# Patient Record
Sex: MALE | Race: WHITE | NOT HISPANIC OR LATINO | Employment: FULL TIME | URBAN - METROPOLITAN AREA
[De-identification: names, ages, dates, MRNs, and addresses within clinical notes are randomized per-mention and may not be internally consistent; named-entity substitution may affect disease eponyms.]

---

## 2023-06-26 PROBLEM — E66.9 OBESITY (BMI 30-39.9): Status: ACTIVE | Noted: 2023-06-26

## 2023-06-26 PROBLEM — R79.89 ELEVATED TROPONIN: Status: ACTIVE | Noted: 2023-06-26

## 2023-06-26 PROBLEM — E78.5 HLD (HYPERLIPIDEMIA): Status: ACTIVE | Noted: 2023-06-26

## 2023-06-26 PROBLEM — I48.91 ATRIAL FIBRILLATION WITH RVR: Status: ACTIVE | Noted: 2023-06-26

## 2023-06-26 PROBLEM — R77.8 ELEVATED TROPONIN: Status: ACTIVE | Noted: 2023-06-26

## 2023-06-26 PROBLEM — I10 ESSENTIAL HYPERTENSION: Status: ACTIVE | Noted: 2023-06-26

## 2023-07-25 ENCOUNTER — TELEPHONE (OUTPATIENT)
Dept: CARDIOLOGY | Facility: CLINIC | Age: 58
End: 2023-07-25
Payer: COMMERCIAL

## 2023-07-25 RX ORDER — ATORVASTATIN CALCIUM 40 MG/1
40 TABLET, FILM COATED ORAL NIGHTLY
Qty: 30 TABLET | Refills: 2 | Status: SHIPPED | OUTPATIENT
Start: 2023-07-25 | End: 2023-10-23

## 2023-07-25 NOTE — TELEPHONE ENCOUNTER
Rx Refill Note  Requested Prescriptions     Pending Prescriptions Disp Refills    atorvastatin (LIPITOR) 40 MG tablet 30 tablet 2     Sig: Take 1 tablet by mouth Every Night for 90 days.      Last office visit with prescribing clinician: 7/18/2023   Last telemedicine visit with prescribing clinician: Visit date not found   Next office visit with prescribing clinician: 8/29/2023                         Would you like a call back once the refill request has been completed: [] Yes [] No    If the office needs to give you a call back, can they leave a voicemail: [] Yes [] No    Farida Pappas MA  07/25/23, 10:00 EDT

## 2023-07-25 NOTE — TELEPHONE ENCOUNTER
Incoming Refill Request      Medication requested (name and dose): ATORVASTATIN 40 MG    Pharmacy where request should be sent: [facility]    Additional details provided by patient:     Best call back number: [phone]    Does the patient have less than a 3 day supply:  [] Yes  [x] No    Nohemi Hernandez Rep  07/25/23, 09:51 EDT

## 2023-07-28 ENCOUNTER — TELEPHONE (OUTPATIENT)
Dept: CARDIOLOGY | Facility: CLINIC | Age: 58
End: 2023-07-28

## 2023-07-28 NOTE — TELEPHONE ENCOUNTER
Patient having frequent episodes of afib per Dr. Jade. Needing verification patient is still taking his Eliquis 5 mg BID.   Called and spoke with patient - states he is taking the Eliquis and does not need refills at this time.   Pt stated that he did stop taking his ASA on his own.

## 2023-08-21 ENCOUNTER — TELEPHONE (OUTPATIENT)
Dept: CARDIOLOGY | Facility: CLINIC | Age: 58
End: 2023-08-21
Payer: COMMERCIAL

## 2023-08-21 RX ORDER — DILTIAZEM HYDROCHLORIDE 240 MG/1
240 CAPSULE, COATED, EXTENDED RELEASE ORAL DAILY
Qty: 90 CAPSULE | Refills: 3 | Status: SHIPPED | OUTPATIENT
Start: 2023-08-21

## 2023-08-21 NOTE — TELEPHONE ENCOUNTER
Spoke with patient - advised to start new medication Cardizem 240mg QD  Verified it is in addition to medications he is already taking.   Pt understood.     Rx Refill Note  Requested Prescriptions     Signed Prescriptions Disp Refills    dilTIAZem CD (CARDIZEM CD) 240 MG 24 hr capsule 90 capsule 3     Sig: Take 1 capsule by mouth Daily.     Authorizing Provider: KENIA BRUCE     Ordering User: KRISTIE MORENO      Last office visit with prescribing clinician: 7/18/2023   Last telemedicine visit with prescribing clinician: Visit date not found   Next office visit with prescribing clinician: 8/22/2023                         Would you like a call back once the refill request has been completed: [] Yes [] No    If the office needs to give you a call back, can they leave a voicemail: [] Yes [] No    Kristie Moreno MA  08/21/23, 13:39 EDT

## 2023-08-21 NOTE — TELEPHONE ENCOUNTER
Pt left a voicemail stating that he sees Dr Jade and is being treated for afib. Pt reports being in afib for 4 days as of 8/18/23. Pt states he does not have any shortness of breath, light headiness, or dizziness. His hear rate has been an average of 146. Pt said that the meds that Dr Jade started him on have not changed anything. Please call pt at [phone].

## 2023-08-21 NOTE — TELEPHONE ENCOUNTER
Spoke with patient - states he has taken extra metoprolol when he feels high heart rates and it does not help. Denies any dizziness but is having some mild soa.    Moved 8/29/23 apt up to tomorrow 8/22/23 @ 930a.   Advised would send note to you and would call back with any orders. If no new orders we will just see him in the morning.   Pt understood

## 2023-08-22 ENCOUNTER — OFFICE VISIT (OUTPATIENT)
Dept: CARDIOLOGY | Facility: CLINIC | Age: 58
End: 2023-08-22
Payer: COMMERCIAL

## 2023-08-22 VITALS
HEART RATE: 55 BPM | SYSTOLIC BLOOD PRESSURE: 159 MMHG | HEIGHT: 75 IN | DIASTOLIC BLOOD PRESSURE: 97 MMHG | OXYGEN SATURATION: 96 % | WEIGHT: 264 LBS | BODY MASS INDEX: 32.83 KG/M2

## 2023-08-22 DIAGNOSIS — I48.91 ATRIAL FIBRILLATION WITH RVR: Primary | ICD-10-CM

## 2023-08-22 DIAGNOSIS — I10 ESSENTIAL HYPERTENSION: ICD-10-CM

## 2023-08-22 DIAGNOSIS — E66.9 OBESITY (BMI 30-39.9): ICD-10-CM

## 2023-08-22 DIAGNOSIS — R77.8 ELEVATED TROPONIN: ICD-10-CM

## 2023-08-22 PROCEDURE — 93000 ELECTROCARDIOGRAM COMPLETE: CPT | Performed by: INTERNAL MEDICINE

## 2023-08-22 PROCEDURE — 99214 OFFICE O/P EST MOD 30 MIN: CPT | Performed by: INTERNAL MEDICINE

## 2023-08-22 NOTE — PROGRESS NOTES
Encounter Date:08/22/2023  Last seen 7/18/2023      Patient ID: Nav Choudhary is a 58 y.o. male.    Chief Complaint:    Atrial fibrillation  Shortness of breath  Hypertension  Dyslipidemia     History of Present Illness  Patient was having increased palpitations more persistent last week.    Patient is taking metoprolol 25 mg twice a day.  Patient is anticoagulated with Eliquis.  Patient was started on Cardizem  mg a day.  Patient has taken his first dose today.    Since I have last seen, the patient has been without any chest discomfort ,shortness of breath,, dizziness or syncope.  Denies having any headache ,abdominal pain ,nausea, vomiting , diarrhea constipation, loss of weight or loss of appetite.  Denies having any excessive bruising ,hematuria or blood in the stool.    Review of all systems negative except as indicated.    Reviewed ROS.    Assessment and Plan         ]]]]]]]]]]]]]]]]]]]]]  History  =============  -Atrial fibrillation with RVR.  Patient has increasing frequency of palpitations.  Patient had work-up in [place] approximately 2 years ago.  Patient was maintaining sinus rhythm.  Patient is now having paroxysmal atrial fibrillation    - Shortness of breath     - Mild elevation of troponin at 180-flat (high-sensitivity troponin)  EKG showed no acute changes     Echocardiogram-6/27/2023 showed proximal posterior and inferior hypokinesis.  Lexiscan Cardiolite test is abnormal with proximal posterior wall ischemia.  6/27/2023     Cardiac cath 6/28/2023 revealed normal left ventricle function and normal coronary arteries.     - Hypertension dyslipidemia     - Exogenous obesity     Status post bilateral knee replacements     - Non-smoker     - Occasional beer drinking     - Allergic to iodine  =============  Plan  ==============  Patient was having increased palpitations more persistent last week.    Patient is taking metoprolol 25 mg twice a day.  Patient is anticoagulated with  Eliquis.  Patient was started on Cardizem  mg a day.  Patient has taken his first dose today.    History of A-fib with RVR.  Patient has converted to sinus rhythm.  Patient is on metoprolol XL 50 mg twice daily (25 mg tablets.)  Patient to take extra 25 mg as needed.    Continue Cardizem  mg a day.  If patient has bradycardia consideration will be given for reducing metoprolol XL to 1 tablet of 25 mg.     History of shortness of breath-improved.  Cardiac cath 6/28/2023 revealed normal coronary arteries and normal left ventricular function.     Hypertension-159/97  Maintain blood pressure log.  Patient was started on Cardizem CD.     Dyslipidemia-continue atorvastatin    Follow-up in the office in 1 week with EKG.  Consideration for ablation if patient has continued problems with symptomatic atrial fibrillation.    Current medications include Eliquis 5 mg twice daily atorvastatin 40 mg p.o. nightly Cardizem  mg a day sublingual nitroglycerin as needed and metoprolol XL 50 mg (25 mg tablet)    Further plan will depend on patient's progress.    Reviewed and updated-8/22/2023.    @@@Patient returns after going to the parking lot with palpitations.  Repeat EKG was performed and patient is in sinus rhythm.  Patient was educated regarding possible paroxysmal atrial fibrillation.  Cardizem CD was started.  Hopefully this will reduce the frequency or duration.  Patient started taking his Cardizem first time yesterday.@@@  ]]]]]]]]]]]]]]]]]]]]]]         Diagnosis Plan   1. Atrial fibrillation with RVR  ECG 12 Lead      2. Elevated troponin  ECG 12 Lead      3. Obesity (BMI 30-39.9)  ECG 12 Lead      4. Essential hypertension  ECG 12 Lead      LAB RESULTS (LAST 7 DAYS)    CBC        BMP        CMP         BNP        TROPONIN        CoAg        Creatinine Clearance  CrCl cannot be calculated (Patient's most recent lab result is older than the maximum 30 days allowed.).    ABG        Radiology  No radiology  results for the last day                The following portions of the patient's history were reviewed and updated as appropriate: allergies, current medications, past family history, past medical history, past social history, past surgical history, and problem list.    Review of Systems   Constitutional: Negative for malaise/fatigue.   Cardiovascular:  Positive for palpitations. Negative for chest pain, dyspnea on exertion and leg swelling.   Respiratory:  Negative for cough and shortness of breath.    Gastrointestinal:  Negative for abdominal pain, nausea and vomiting.   Neurological:  Negative for dizziness, focal weakness, headaches, light-headedness and numbness.   All other systems reviewed and are negative.      Current Outpatient Medications:     acetaminophen (TYLENOL) 325 MG tablet, Take 2 tablets by mouth Every 4 (Four) Hours As Needed for Mild Pain (temperature greater than 101F) for up to 90 days., Disp: 90 tablet, Rfl: 2    apixaban (ELIQUIS) 5 MG tablet tablet, Take 1 tablet by mouth 2 (Two) Times a Day., Disp: 180 tablet, Rfl: 4    atorvastatin (LIPITOR) 40 MG tablet, Take 1 tablet by mouth Every Night for 90 days., Disp: 30 tablet, Rfl: 2    dilTIAZem CD (CARDIZEM CD) 240 MG 24 hr capsule, Take 1 capsule by mouth Daily., Disp: 90 capsule, Rfl: 3    metoprolol succinate XL (TOPROL-XL) 25 MG 24 hr tablet, Take 2 tablets by mouth Every 12 (Twelve) Hours for 90 days., Disp: 180 tablet, Rfl: 3    nitroglycerin (NITROSTAT) 0.4 MG SL tablet, Place 1 tablet under the tongue Every 5 (Five) Minutes As Needed for Chest Pain (Only if SBP Greater Than 100) for up to 90 days. Take no more than 3 doses in 15 minutes., Disp: 30 tablet, Rfl: 12    Allergies   Allergen Reactions    Iodine Irritability     Topical irritation       Family History   Problem Relation Age of Onset    Heart disease Mother     Heart disease Father     Heart attack Father        Past Surgical History:   Procedure Laterality Date     "CARDIAC CATHETERIZATION N/A 6/28/2023    Procedure: Left Heart Cath and coronary angiogram;  Surgeon: Greg Jade MD;  [facility];  Service: Cardiovascular;  Laterality: N/A;    KNEE ARTHROPLASTY         Past Medical History:   Diagnosis Date    Abnormal ECG 6/27/23    Arrhythmia 3/13/2021    Atrial fibrillation     HLD (hyperlipidemia)     Hypertension        Family History   Problem Relation Age of Onset    Heart disease Mother     Heart disease Father     Heart attack Father        Social History     Socioeconomic History    Marital status:    Tobacco Use    Smoking status: Never     Passive exposure: Past (AS A CHILD)    Smokeless tobacco: Never   Vaping Use    Vaping Use: Never used   Substance and Sexual Activity    Alcohol use: Yes     Alcohol/week: 12.0 standard drinks     Types: 12 Cans of beer per week     Comment: Beer on occasion    Drug use: Never    Sexual activity: Yes     Partners: Female     Birth control/protection: None           ECG 12 Lead    Date/Time: 8/22/2023 9:34 AM  Performed by: Greg Jade MD  Authorized by: Greg Jade MD   Comparison: compared with previous ECG   Similar to previous ECG  Comparison to previous ECG: Sinus bradycardia 54/min nonspecific ST-T wave changes normal axis normal intervals no ectopy no significant change from previous EKG.    Repeat EKG showed sinus bradycardia (9:57 AM)        Objective:       Physical Exam    /97 (BP Location: Left arm, Patient Position: Sitting, Cuff Size: Adult)   Pulse 55   Ht 190.5 cm (75\")   Wt 120 kg (264 lb)   SpO2 96%   BMI 33.00 kg/mý   The patient is alert, oriented and in no distress.    Vital signs as noted above.    Head and neck revealed no carotid bruits or jugular venous distension.  No thyromegaly or lymphadenopathy is present.    Lungs clear.  No wheezing.  Breath sounds are normal bilaterally.    Heart normal first and second heart sounds.  No " murmur..  No pericardial rub is present.  No gallop is present.    Abdomen soft and nontender.  No organomegaly is present.    Extremities revealed good peripheral pulses without any pedal edema.    Skin warm and dry.    Musculoskeletal system is grossly normal.    CNS grossly normal.    Reviewed and updated.

## 2023-08-23 NOTE — PROGRESS NOTES
Encounter Date:09/12/2023  Last seen 8/22/2023      Patient ID: Nav Choudhary is a 58 y.o. male.    Chief Complaint:    Atrial fibrillation  Shortness of breath  Hypertension  Dyslipidemia  Atrial flutter with RVR     History of Present Illness  Patient was last seen 3/22/2023.  Patient has been intermittent palpitations but persistent in the last 48 hours.   Patient is taking metoprolol 50 mg twice a day.  (25 mg tablet  Patient is on Eliquis.  Patient is on Cardizem  mg a day.       Since I have last seen, the patient has been without any chest discomfort ,shortness of breath,, dizziness or syncope.  Denies having any headache ,abdominal pain ,nausea, vomiting , diarrhea constipation, loss of weight or loss of appetite.  Denies having any excessive bruising ,hematuria or blood in the stool.     Review of all systems negative except as indicated.     Reviewed ROS.    Assessment and Plan         ]]]]]]]]]]]]]]]]]]]]]  History  =============  -Atrial fibrillation with RVR.  Patient has increasing frequency of palpitations.  Patient had work-up in [place] approximately 2 years ago.  Patient was maintaining sinus rhythm.  Patient is now having paroxysmal atrial fibrillation     - Shortness of breath     - Mild elevation of troponin at 180-flat (high-sensitivity troponin)  EKG showed no acute changes     Echocardiogram-6/27/2023 showed proximal posterior and inferior hypokinesis.  Lexiscan Cardiolite test is abnormal with proximal posterior wall ischemia.  6/27/2023     Cardiac cath 6/28/2023 revealed normal left ventricle function and normal coronary arteries.     - Hypertension dyslipidemia     - Exogenous obesity     Status post bilateral knee replacements     - Non-smoker     - Occasional beer drinking     - Allergic to iodine  =============  Plan  ==============  Patient was having increased palpitations more persistent last week.      History of A-fib with RVR.  Patient has converted to sinus  rhythm.    Patient has been intermittent palpitations but persistent in the last 48 hours.   Patient is taking metoprolol 50 mg twice a day.  (25 mg tablet  Patient is on Eliquis.  Patient is on Cardizem  mg a day.    EKG today showed atrial flutter with rapid ventricular response at a rate of 145/min.    Cardiac cath 6/28/2023 revealed normal coronary arteries and normal left ventricular function.     Hypertension-124/87     Dyslipidemia-continue atorvastatin     Options were discussed with patient and patient's wife.  Have discussed with Dr. Mcdowell regarding possible ablation for atrial flutter.  Patient was advised admission to the hospital to try to slow down and convert from a flutter with RVR to sinus rhythm.  Patient is reluctant to be admitted today.  Patient increase metoprolol XL to 75 mg twice daily (25 mg tablet).  Patient is willing to come to the hospital tomorrow and may need to be treated with intravenous medication to try to control and hopefully convert to sinus rhythm.  Hopefully ablation can be arranged for Thursday.    Further plan will depend on patient's progress.  ]]]]]]]]]]]]]]]]]]]]]]                 Diagnosis Plan   1. Essential hypertension        2. Obesity (BMI 30-39.9)        3. Atrial fibrillation with RVR        LAB RESULTS (LAST 7 DAYS)    CBC        BMP        CMP         BNP        TROPONIN        CoAg        Creatinine Clearance  CrCl cannot be calculated (Patient's most recent lab result is older than the maximum 30 days allowed.).    ABG        Radiology  No radiology results for the last day                The following portions of the patient's history were reviewed and updated as appropriate: allergies, current medications, past family history, past medical history, past social history, past surgical history, and problem list.    Review of Systems   Constitutional: Negative for malaise/fatigue.   Cardiovascular:  Positive for palpitations (RACING - HAS IMPROVED  SINCE START OF NEW MED). Negative for chest pain, leg swelling and syncope.   Respiratory:  Positive for shortness of breath.    Skin:  Negative for rash.   Gastrointestinal:  Negative for nausea and vomiting.   Neurological:  Negative for dizziness, light-headedness and numbness.   All other systems reviewed and are negative.      Current Outpatient Medications:     acetaminophen (TYLENOL) 325 MG tablet, Take 2 tablets by mouth Every 4 (Four) Hours As Needed for Mild Pain (temperature greater than 101F) for up to 90 days., Disp: 90 tablet, Rfl: 2    apixaban (ELIQUIS) 5 MG tablet tablet, Take 1 tablet by mouth 2 (Two) Times a Day., Disp: 180 tablet, Rfl: 4    atorvastatin (LIPITOR) 40 MG tablet, TAKE 1 TABLET BY MOUTH EVERY NIGHT FOR 90 DAYS., Disp: 90 tablet, Rfl: 3    dilTIAZem CD (CARDIZEM CD) 240 MG 24 hr capsule, Take 1 capsule by mouth Daily., Disp: 90 capsule, Rfl: 3    metoprolol succinate XL (TOPROL-XL) 25 MG 24 hr tablet, Take 2 tablets by mouth Every 12 (Twelve) Hours for 90 days., Disp: 180 tablet, Rfl: 3    nitroglycerin (NITROSTAT) 0.4 MG SL tablet, Place 1 tablet under the tongue Every 5 (Five) Minutes As Needed for Chest Pain (Only if SBP Greater Than 100) for up to 90 days. Take no more than 3 doses in 15 minutes., Disp: 30 tablet, Rfl: 12    Allergies   Allergen Reactions    Iodine Irritability     Topical irritation       Family History   Problem Relation Age of Onset    Heart disease Mother     Heart disease Father     Heart attack Father        Past Surgical History:   Procedure Laterality Date    CARDIAC CATHETERIZATION N/A 6/28/2023    Procedure: Left Heart Cath and coronary angiogram;  Surgeon: Greg Jade MD;  Location: [facility] CATH [facility]AS[facility];  Service: Cardiovascular;  Laterality: N/A;    KNEE ARTHROPLASTY         Past Medical History:   Diagnosis Date    Abnormal ECG 6/27/23    Arrhythmia 3/13/2021    Atrial fibrillation     HLD (hyperlipidemia)     Hypertension        Family  "History   Problem Relation Age of Onset    Heart disease Mother     Heart disease Father     Heart attack Father        Social History     Socioeconomic History    Marital status:    Tobacco Use    Smoking status: Never     Passive exposure: Past (AS A CHILD)    Smokeless tobacco: Never   Vaping Use    Vaping Use: Never used   Substance and Sexual Activity    Alcohol use: Yes     Alcohol/week: 12.0 standard drinks     Types: 12 Cans of beer per week     Comment: Beer on occasion    Drug use: Never    Sexual activity: Yes     Partners: Female     Birth control/protection: None           ECG 12 Lead    Date/Time: 9/12/2023 2:06 PM  Performed by: Greg Jade MD  Authorized by: Greg Jade MD   Comparison: compared with previous ECG   Comparison to previous ECG: Atrial flutter with rapid ventricular response 145/min nonspecific ST-T wave changes indeterminate axis no ectopy compared to 8/22/2023 atrial flutter with RVR is new.        Objective:       Physical Exam    /87 (BP Location: Left arm, Patient Position: Sitting, Cuff Size: Large Adult)   Pulse (!) 145 Comment: EKG READING  Ht 190.5 cm (75\")   Wt 122 kg (268 lb)   SpO2 98% Comment: RA  BMI 33.50 kg/m²   The patient is alert, oriented and in no distress.    Vital signs as noted above.    Head and neck revealed no carotid bruits or jugular venous distension.  No thyromegaly or lymphadenopathy is present.    Lungs clear.  No wheezing.  Breath sounds are normal bilaterally.    Heart normal first and second heart sounds.  No murmur..  No pericardial rub is present.  No gallop is present.    Abdomen soft and nontender.  No organomegaly is present.    Extremities revealed good peripheral pulses without any pedal edema.    Skin warm and dry.    Musculoskeletal system is grossly normal.    CNS grossly normal.    Reviewed and updated.        "

## 2023-08-28 RX ORDER — ATORVASTATIN CALCIUM 40 MG/1
40 TABLET, FILM COATED ORAL NIGHTLY
Qty: 90 TABLET | Refills: 3 | Status: SHIPPED | OUTPATIENT
Start: 2023-08-28 | End: 2024-08-22

## 2023-08-28 NOTE — TELEPHONE ENCOUNTER
[facility]-CoA Reductase Inhibitors (Statins) Protocol [id number]/26/2023 01:46 PM   Protocol Details ALK Phos in past 12 months    ALT in past 12 months    AST in past 12 months       Rx Refill Note  Requested Prescriptions     Pending Prescriptions Disp Refills    atorvastatin (LIPITOR) 40 MG tablet [Pharmacy Med Name: ATORVASTATIN 40 MG TABLET] 90 tablet      Sig: TAKE 1 TABLET BY MOUTH EVERY NIGHT FOR 90 DAYS.      Last office visit with prescribing clinician: 8/22/2023   Last telemedicine visit with prescribing clinician: Visit date not found   Next office visit with prescribing clinician: 9/12/2023                         Would you like a call back once the refill request has been completed: [] Yes [] No    If the office needs to give you a call back, can they leave a voicemail: [] Yes [] No    Kristie Moreno MA  08/28/23, 08:33 EDT

## 2023-09-12 ENCOUNTER — OFFICE VISIT (OUTPATIENT)
Dept: CARDIOLOGY | Facility: CLINIC | Age: 58
End: 2023-09-12
Payer: COMMERCIAL

## 2023-09-12 VITALS
HEART RATE: 145 BPM | HEIGHT: 75 IN | DIASTOLIC BLOOD PRESSURE: 87 MMHG | SYSTOLIC BLOOD PRESSURE: 124 MMHG | BODY MASS INDEX: 33.32 KG/M2 | WEIGHT: 268 LBS | OXYGEN SATURATION: 98 %

## 2023-09-12 DIAGNOSIS — Z79.01 CHRONIC ANTICOAGULATION: ICD-10-CM

## 2023-09-12 DIAGNOSIS — I10 ESSENTIAL HYPERTENSION: Primary | ICD-10-CM

## 2023-09-12 DIAGNOSIS — I48.91 ATRIAL FIBRILLATION WITH RVR: ICD-10-CM

## 2023-09-12 DIAGNOSIS — I48.92 ATRIAL FLUTTER WITH RAPID VENTRICULAR RESPONSE: ICD-10-CM

## 2023-09-12 DIAGNOSIS — E66.9 OBESITY (BMI 30-39.9): ICD-10-CM

## 2023-09-13 ENCOUNTER — APPOINTMENT (OUTPATIENT)
Dept: GENERAL RADIOLOGY | Facility: HOSPITAL | Age: 58
DRG: 274 | End: 2023-09-13
Payer: COMMERCIAL

## 2023-09-13 ENCOUNTER — HOSPITAL ENCOUNTER (INPATIENT)
Facility: HOSPITAL | Age: 58
LOS: 1 days | Discharge: HOME | DRG: 274 | End: 2023-09-15
Attending: EMERGENCY MEDICINE
Payer: COMMERCIAL

## 2023-09-13 ENCOUNTER — TELEPHONE (OUTPATIENT)
Dept: CARDIOLOGY | Facility: CLINIC | Age: 58
End: 2023-09-13
Payer: COMMERCIAL

## 2023-09-13 DIAGNOSIS — I48.91 ATRIAL FIBRILLATION WITH RVR: ICD-10-CM

## 2023-09-13 DIAGNOSIS — R00.2 PALPITATIONS: Primary | ICD-10-CM

## 2023-09-13 DIAGNOSIS — I48.3 TYPICAL ATRIAL FLUTTER: ICD-10-CM

## 2023-09-13 PROBLEM — I48.92 ATRIAL FLUTTER: Status: ACTIVE | Noted: 2023-09-13

## 2023-09-13 LAB
ALBUMIN SERPL-MCNC: 4 G/DL (ref 3.5–5.2)
ALBUMIN/GLOB SERPL: 1.9 G/DL
ALP SERPL-CCNC: 81 U/L (ref 39–117)
ALT SERPL W P-5'-P-CCNC: 22 U/L (ref 1–41)
ANION GAP SERPL CALCULATED.3IONS-SCNC: 11 MMOL/L (ref 5–15)
AST SERPL-CCNC: 17 U/L (ref 1–40)
BASOPHILS # BLD AUTO: 0.1 10*3/MM3 (ref 0–0.2)
BASOPHILS NFR BLD AUTO: 1 % (ref 0–1.5)
BILIRUB SERPL-MCNC: 0.4 MG/DL (ref 0–1.2)
BUN SERPL-MCNC: 17 MG/DL (ref 6–20)
BUN/CREAT SERPL: 15.6 (ref 7–25)
CALCIUM SPEC-SCNC: 8.9 MG/DL (ref 8.6–10.5)
CHLORIDE SERPL-SCNC: 107 MMOL/L (ref 98–107)
CO2 SERPL-SCNC: 22 MMOL/L (ref 22–29)
CREAT SERPL-MCNC: 1.09 MG/DL (ref 0.76–1.27)
DEPRECATED RDW RBC AUTO: 45.9 FL (ref 37–54)
EGFRCR SERPLBLD CKD-EPI 2021: 79.7 ML/MIN/1.73
EOSINOPHIL # BLD AUTO: 0.1 10*3/MM3 (ref 0–0.4)
EOSINOPHIL NFR BLD AUTO: 2 % (ref 0.3–6.2)
ERYTHROCYTE [DISTWIDTH] IN BLOOD BY AUTOMATED COUNT: 14.8 % (ref 12.3–15.4)
GLOBULIN UR ELPH-MCNC: 2.1 GM/DL
GLUCOSE SERPL-MCNC: 101 MG/DL (ref 65–99)
HCT VFR BLD AUTO: 43.9 % (ref 37.5–51)
HGB BLD-MCNC: 14.7 G/DL (ref 13–17.7)
HOLD SPECIMEN: NORMAL
LYMPHOCYTES # BLD AUTO: 1.4 10*3/MM3 (ref 0.7–3.1)
LYMPHOCYTES NFR BLD AUTO: 18.9 % (ref 19.6–45.3)
MCH RBC QN AUTO: 30.2 PG (ref 26.6–33)
MCHC RBC AUTO-ENTMCNC: 33.6 G/DL (ref 31.5–35.7)
MCV RBC AUTO: 89.9 FL (ref 79–97)
MONOCYTES # BLD AUTO: 0.7 10*3/MM3 (ref 0.1–0.9)
MONOCYTES NFR BLD AUTO: 9.8 % (ref 5–12)
NEUTROPHILS NFR BLD AUTO: 5.1 10*3/MM3 (ref 1.7–7)
NEUTROPHILS NFR BLD AUTO: 68.3 % (ref 42.7–76)
NRBC BLD AUTO-RTO: 0.1 /100 WBC (ref 0–0.2)
NT-PROBNP SERPL-MCNC: 1230 PG/ML (ref 0–900)
PLATELET # BLD AUTO: 219 10*3/MM3 (ref 140–450)
PMV BLD AUTO: 8.2 FL (ref 6–12)
POTASSIUM SERPL-SCNC: 4 MMOL/L (ref 3.5–5.2)
PROT SERPL-MCNC: 6.1 G/DL (ref 6–8.5)
RBC # BLD AUTO: 4.88 10*6/MM3 (ref 4.14–5.8)
SODIUM SERPL-SCNC: 140 MMOL/L (ref 136–145)
TROPONIN T SERPL HS-MCNC: 8 NG/L
WBC NRBC COR # BLD: 7.5 10*3/MM3 (ref 3.4–10.8)
WHOLE BLOOD HOLD COAG: NORMAL

## 2023-09-13 PROCEDURE — 99285 EMERGENCY DEPT VISIT HI MDM: CPT

## 2023-09-13 PROCEDURE — 84484 ASSAY OF TROPONIN QUANT: CPT | Performed by: NURSE PRACTITIONER

## 2023-09-13 PROCEDURE — 71045 X-RAY EXAM CHEST 1 VIEW: CPT

## 2023-09-13 PROCEDURE — G0378 HOSPITAL OBSERVATION PER HR: HCPCS

## 2023-09-13 PROCEDURE — 80053 COMPREHEN METABOLIC PANEL: CPT | Performed by: NURSE PRACTITIONER

## 2023-09-13 PROCEDURE — 83880 ASSAY OF NATRIURETIC PEPTIDE: CPT | Performed by: NURSE PRACTITIONER

## 2023-09-13 PROCEDURE — 25010000002 AMIODARONE IN DEXTROSE 5% 150-4.21 MG/100ML-% SOLUTION: Performed by: NURSE PRACTITIONER

## 2023-09-13 PROCEDURE — 85025 COMPLETE CBC W/AUTO DIFF WBC: CPT | Performed by: NURSE PRACTITIONER

## 2023-09-13 PROCEDURE — 93005 ELECTROCARDIOGRAM TRACING: CPT | Performed by: EMERGENCY MEDICINE

## 2023-09-13 PROCEDURE — 25010000002 AMIODARONE IN DEXTROSE 5% 360-4.14 MG/200ML-% SOLUTION: Performed by: NURSE PRACTITIONER

## 2023-09-13 RX ORDER — SODIUM CHLORIDE 0.9 % (FLUSH) 0.9 %
10 SYRINGE (ML) INJECTION AS NEEDED
Status: DISCONTINUED | OUTPATIENT
Start: 2023-09-13 | End: 2023-09-15 | Stop reason: HOSPADM

## 2023-09-13 RX ORDER — POLYETHYLENE GLYCOL 3350 17 G/17G
17 POWDER, FOR SOLUTION ORAL DAILY PRN
Status: DISCONTINUED | OUTPATIENT
Start: 2023-09-13 | End: 2023-09-15 | Stop reason: HOSPADM

## 2023-09-13 RX ORDER — METOPROLOL TARTRATE 5 MG/5ML
5 INJECTION INTRAVENOUS ONCE
Status: COMPLETED | OUTPATIENT
Start: 2023-09-13 | End: 2023-09-13

## 2023-09-13 RX ORDER — DILTIAZEM HYDROCHLORIDE 240 MG/1
240 CAPSULE, COATED, EXTENDED RELEASE ORAL DAILY
Status: DISCONTINUED | OUTPATIENT
Start: 2023-09-13 | End: 2023-09-14

## 2023-09-13 RX ORDER — SODIUM CHLORIDE 0.9 % (FLUSH) 0.9 %
10 SYRINGE (ML) INJECTION EVERY 12 HOURS SCHEDULED
Status: DISCONTINUED | OUTPATIENT
Start: 2023-09-13 | End: 2023-09-15 | Stop reason: HOSPADM

## 2023-09-13 RX ORDER — SODIUM CHLORIDE 9 MG/ML
40 INJECTION, SOLUTION INTRAVENOUS AS NEEDED
Status: DISCONTINUED | OUTPATIENT
Start: 2023-09-13 | End: 2023-09-15 | Stop reason: HOSPADM

## 2023-09-13 RX ORDER — NITROGLYCERIN 0.4 MG/1
0.4 TABLET SUBLINGUAL
Status: DISCONTINUED | OUTPATIENT
Start: 2023-09-13 | End: 2023-09-15 | Stop reason: HOSPADM

## 2023-09-13 RX ORDER — ATORVASTATIN CALCIUM 40 MG/1
40 TABLET, FILM COATED ORAL DAILY
COMMUNITY

## 2023-09-13 RX ORDER — DILTIAZEM HCL/D5W 125 MG/125
5-15 PLASTIC BAG, INJECTION (ML) INTRAVENOUS
Status: DISCONTINUED | OUTPATIENT
Start: 2023-09-13 | End: 2023-09-13

## 2023-09-13 RX ORDER — AMOXICILLIN 250 MG
2 CAPSULE ORAL 2 TIMES DAILY
Status: DISCONTINUED | OUTPATIENT
Start: 2023-09-13 | End: 2023-09-15 | Stop reason: HOSPADM

## 2023-09-13 RX ORDER — ATORVASTATIN CALCIUM 40 MG/1
40 TABLET, FILM COATED ORAL DAILY
Status: DISCONTINUED | OUTPATIENT
Start: 2023-09-14 | End: 2023-09-15 | Stop reason: HOSPADM

## 2023-09-13 RX ORDER — BISACODYL 5 MG/1
5 TABLET, DELAYED RELEASE ORAL DAILY PRN
Status: DISCONTINUED | OUTPATIENT
Start: 2023-09-13 | End: 2023-09-15 | Stop reason: HOSPADM

## 2023-09-13 RX ORDER — ACETAMINOPHEN 325 MG/1
650 TABLET ORAL EVERY 4 HOURS PRN
Status: DISCONTINUED | OUTPATIENT
Start: 2023-09-13 | End: 2023-09-15 | Stop reason: HOSPADM

## 2023-09-13 RX ORDER — BISACODYL 10 MG
10 SUPPOSITORY, RECTAL RECTAL DAILY PRN
Status: DISCONTINUED | OUTPATIENT
Start: 2023-09-13 | End: 2023-09-15 | Stop reason: HOSPADM

## 2023-09-13 RX ORDER — ONDANSETRON 2 MG/ML
4 INJECTION INTRAMUSCULAR; INTRAVENOUS EVERY 6 HOURS PRN
Status: DISCONTINUED | OUTPATIENT
Start: 2023-09-13 | End: 2023-09-15 | Stop reason: HOSPADM

## 2023-09-13 RX ORDER — DILTIAZEM HYDROCHLORIDE 5 MG/ML
20 INJECTION INTRAVENOUS ONCE
Status: COMPLETED | OUTPATIENT
Start: 2023-09-13 | End: 2023-09-13

## 2023-09-13 RX ORDER — METOPROLOL SUCCINATE 50 MG/1
50 TABLET, EXTENDED RELEASE ORAL EVERY 12 HOURS SCHEDULED
Status: DISCONTINUED | OUTPATIENT
Start: 2023-09-13 | End: 2023-09-14

## 2023-09-13 RX ADMIN — DILTIAZEM HYDROCHLORIDE 20 MG: 5 INJECTION INTRAVENOUS at 15:51

## 2023-09-13 RX ADMIN — AMIODARONE HYDROCHLORIDE 150 MG: 1.5 INJECTION, SOLUTION INTRAVENOUS at 17:41

## 2023-09-13 RX ADMIN — METOPROLOL SUCCINATE 50 MG: 50 TABLET, EXTENDED RELEASE ORAL at 20:44

## 2023-09-13 RX ADMIN — Medication 5 MG/HR: at 16:23

## 2023-09-13 RX ADMIN — AMIODARONE HYDROCHLORIDE 1 MG/MIN: 1.8 INJECTION, SOLUTION INTRAVENOUS at 23:29

## 2023-09-13 RX ADMIN — METOPROLOL TARTRATE 5 MG: 1 INJECTION, SOLUTION INTRAVENOUS at 18:11

## 2023-09-13 RX ADMIN — APIXABAN 5 MG: 5 TABLET, FILM COATED ORAL at 20:44

## 2023-09-13 RX ADMIN — AMIODARONE HYDROCHLORIDE 1 MG/MIN: 1.8 INJECTION, SOLUTION INTRAVENOUS at 18:04

## 2023-09-13 RX ADMIN — Medication 10 ML: at 20:44

## 2023-09-13 RX ADMIN — DILTIAZEM HYDROCHLORIDE 240 MG: 240 CAPSULE, EXTENDED RELEASE ORAL at 19:55

## 2023-09-13 NOTE — TELEPHONE ENCOUNTER
Pt is still in afib, asking should he go to hospital?  Make ov? Change medication?  States he did not go to hospital yesterday

## 2023-09-13 NOTE — CONSULTS
Referring Provider: Daniele Tamayo DO  Reason for Consultation:  Persistent atrial flutter    Patient Care Team:  Provider, No Known as PCP - General    Chief complaint  Abnormal heart rhythm    Subjective .     History of present illness:  Nav Choudhary is a 58 y.o. male who presents with history of abnormal heart rhythm.  Patient has history of atrial fibrillation.  Patient had ablation in the past.  Patient recently has been having recurrent and persistent atrial flutter.  Patient has been on Cardizem and metoprolol as well as anticoagulation with Eliquis.  Patient was seen in the office and patient was advised admission to the hospital since patient had atrial flutter with rate of 145/min.  Patient wanted to wait and return to the ER today.  Patient initially was started on intravenous Cardizem and patient did not respond to decreased rate.  Patient was started on intravenous amiodarone.    ROS      The patient has been without any chest discomfort, shortness of breath, palpitations, dizziness or syncope.  Denies having any headache, abdominal pain, nausea, vomiting, diarrhea, constipation, loss of weight or loss of appetite.  Denies having any excessive bruising, hematuria or blood in the stool.    Review of all systems negative except as indicated      History  Past Medical History:   Diagnosis Date    Abnormal ECG 6/27/23    Arrhythmia 3/13/2021    Atrial fibrillation     HLD (hyperlipidemia)     Hypertension        Past Surgical History:   Procedure Laterality Date    CARDIAC CATHETERIZATION N/A 6/28/2023    Procedure: Left Heart Cath and coronary angiogram;  Surgeon: Greg Jade MD;  Location: [facility] CATH INVASIVE LOCATION;  Service: Cardiovascular;  Laterality: N/A;    KNEE ARTHROPLASTY         Family History   Problem Relation Age of Onset    Heart disease Mother     Heart disease Father     Heart attack Father        Social History     Tobacco Use    Smoking status: Never     Passive  "exposure: Past (AS A CHILD)    Smokeless tobacco: Never   Vaping Use    Vaping Use: Never used   Substance Use Topics    Alcohol use: Yes     Alcohol/week: 12.0 standard drinks     Types: 12 Cans of beer per week     Comment: Beer on occasion    Drug use: Never        (Not in a hospital admission)        Iodine    Scheduled Meds:   Continuous Infusions:amiodarone, 1 mg/min, Last Rate: 1 mg/min (09/13/23 1804)   Followed by  [START ON 9/14/2023] amiodarone, 0.5 mg/min      PRN Meds:.  sodium chloride    Objective     VITAL SIGNS  Vitals:    09/13/23 1438 09/13/23 1629 09/13/23 1747 09/13/23 1811   BP:  126/93 128/92 129/93   Pulse:  (!) 146 (!) 144 (!) 148   Resp: 16 18     Temp: 98.3 °F (36.8 °C)      TempSrc: Oral      SpO2:  98%     Weight: 122 kg (268 lb 15.4 oz)      Height: 190.5 cm (75\")          Flowsheet Rows      Flowsheet Row First Filed Value   Admission Height 190.5 cm (75\") Documented at 09/13/2023 1438   Admission Weight 122 kg (268 lb 15.4 oz) Documented at 09/13/2023 1438            No intake or output data in the 24 hours ending 09/13/23 1825     TELEMETRY: Atrial flutter with rapid ventricular response.    Physical Exam:  The patient is alert, oriented and in no distress.  Vital signs as noted above.  Head and neck revealed no carotid bruits or jugular venous distention.  No thyromegaly or lymphadenopathy is present  Lungs clear.  No wheezing.  Breath sounds are normal bilaterally.  Heart normal first and second heart sounds. No murmur.  No precordial rub is present.  No gallop is present.  Abdomen soft and nontender.  No organomegaly is present.  Extremities with good peripheral pulses without any pedal edema.  Skin warm and dry.  Musculoskeletal system is grossly normal  CNS grossly normal    Reviewed and updated.    Results Review:   I reviewed the patient's new clinical results.  Lab Results (last 24 hours)       Procedure Component Value Units Date/Time    Extra Tubes [[id number]] Collected: " 09/13/23 1510    Specimen: Blood, Venous Line Updated: 09/13/23 1615    Narrative:      The following orders were created for panel order Extra Tubes.  Procedure                               Abnormality         Status                     ---------                               -----------         ------                     Gold Top - SST[[id number]]                                   Final result               Light Blue Top[[id number]]                                   Final result                 Please view results for these tests on the individual orders.    Gold Top - SST [[id number]] Collected: 09/13/23 1510    Specimen: Blood Updated: 09/13/23 1615     Extra Tube Hold for add-ons.     Comment: Auto resulted.       Light Blue Top [[id number]] Collected: 09/13/23 1510    Specimen: Blood Updated: 09/13/23 1615     Extra Tube Hold for add-ons.     Comment: Auto resulted       Comprehensive Metabolic Panel [[id number]]  (Abnormal) Collected: 09/13/23 1510    Specimen: Blood Updated: 09/13/23 1543     Glucose 101 mg/dL      BUN 17 mg/dL      Creatinine 1.09 mg/dL      Sodium 140 mmol/L      Potassium 4.0 mmol/L      Chloride 107 mmol/L      CO2 22.0 mmol/L      Calcium 8.9 mg/dL      Total Protein 6.1 g/dL      Albumin 4.0 g/dL      ALT (SGPT) 22 U/L      AST (SGOT) 17 U/L      Alkaline Phosphatase 81 U/L      Total Bilirubin 0.4 mg/dL      Globulin 2.1 gm/dL      A/G Ratio 1.9 g/dL      BUN/Creatinine Ratio 15.6     Anion Gap 11.0 mmol/L      eGFR 79.7 mL/min/1.73     Narrative:      GFR Normal >60  Chronic Kidney Disease <60  Kidney Failure <15      Single High Sensitivity Troponin T [[id number]]  (Normal) Collected: 09/13/23 1510    Specimen: Blood Updated: 09/13/23 1543     HS Troponin T 8 ng/L     Narrative:      High Sensitive Troponin T Reference Range:  <10.0 ng/L- Negative Female for AMI  <15.0 ng/L- Negative Male for AMI  >=10 - Abnormal Female indicating possible myocardial injury.  >=15 - Abnormal Male  indicating possible myocardial injury.   Clinicians would have to utilize clinical acumen, EKG, Troponin, and serial changes to determine if it is an Acute Myocardial Infarction or myocardial injury due to an underlying chronic condition.         BNP [[id number]]  (Abnormal) Collected: 09/13/23 1510    Specimen: Blood Updated: 09/13/23 1543     proBNP 1,230.0 pg/mL     Narrative:      Among patients with dyspnea, NT-proBNP is highly sensitive for the detection of acute congestive heart failure. In addition NT-proBNP of <300 pg/ml effectively rules out acute congestive heart failure with 99% negative predictive value.      CBC & Differential [[id number]]  (Abnormal) Collected: 09/13/23 1510    Specimen: Blood Updated: 09/13/23 1521    Narrative:      The following orders were created for panel order CBC & Differential.  Procedure                               Abnormality         Status                     ---------                               -----------         ------                     CBC Auto Differential[[id number]]        Abnormal            Final result                 Please view results for these tests on the individual orders.    CBC Auto Differential [[id number]]  (Abnormal) Collected: 09/13/23 1510    Specimen: Blood Updated: 09/13/23 1521     WBC 7.50 10*3/mm3      RBC 4.88 10*6/mm3      Hemoglobin 14.7 g/dL      Hematocrit 43.9 %      MCV 89.9 fL      MCH 30.2 pg      MCHC 33.6 g/dL      RDW 14.8 %      RDW-SD 45.9 fl      MPV 8.2 fL      Platelets 219 10*3/mm3      Neutrophil % 68.3 %      Lymphocyte % 18.9 %      Monocyte % 9.8 %      Eosinophil % 2.0 %      Basophil % 1.0 %      Neutrophils, Absolute 5.10 10*3/mm3      Lymphocytes, Absolute 1.40 10*3/mm3      Monocytes, Absolute 0.70 10*3/mm3      Eosinophils, Absolute 0.10 10*3/mm3      Basophils, Absolute 0.10 10*3/mm3      nRBC 0.1 /100 WBC             Imaging Results (Last 24 Hours)       Procedure Component Value Units Date/Time    XR Chest 1  View [[id number]] Collected: 09/13/23 1537     Updated: 09/13/23 1540    Narrative:      XR CHEST 1 VW    Date of Exam: 9/13/2023 3:24 PM EDT    Indication: CHF/COPD Protocol CHF/COPD Protocol    Comparison: None available.    Findings:  Cardiomediastinal silhouette is within normal limits, considering portable technique. No focal consolidation or overt pulmonary edema. No pleural effusion or pneumothorax. Osseous structures are unremarkable.      Impression:      Impression:  No evidence of acute cardiopulmonary disease.    Electronically Signed: Flip Springer MD    9/13/2023 3:38 PM EDT    Workstation ID: [id number]        LAB RESULTS (LAST 7 DAYS)    CBC  Results from last 7 days   Lab Units 09/13/23  1510   WBC 10*3/mm3 7.50   RBC 10*6/mm3 4.88   HEMOGLOBIN g/dL 14.7   HEMATOCRIT % 43.9   MCV fL 89.9   PLATELETS 10*3/mm3 219       BMP  Results from last 7 days   Lab Units 09/13/23  1510   SODIUM mmol/L 140   POTASSIUM mmol/L 4.0   CHLORIDE mmol/L 107   CO2 mmol/L 22.0   BUN mg/dL 17   CREATININE mg/dL 1.09   GLUCOSE mg/dL 101*       CMP   Results from last 7 days   Lab Units 09/13/23  1510   SODIUM mmol/L 140   POTASSIUM mmol/L 4.0   CHLORIDE mmol/L 107   CO2 mmol/L 22.0   BUN mg/dL 17   CREATININE mg/dL 1.09   GLUCOSE mg/dL 101*   ALBUMIN g/dL 4.0   BILIRUBIN mg/dL 0.4   ALK PHOS U/L 81   AST (SGOT) U/L 17   ALT (SGPT) U/L 22         BNP        TROPONIN  Results from last 7 days   Lab Units 09/13/23  1510   HSTROP T ng/L 8       CoAg        Creatinine Clearance  Estimated Creatinine Clearance: 106.5 mL/min (by C-G formula based on SCr of 1.09 mg/dL).    ABG        Radiology  XR Chest 1 View    Result Date: 9/13/2023  Impression: No evidence of acute cardiopulmonary disease. Electronically Signed: Flip Springer MD  9/13/2023 3:38 PM EDT  Workstation ID: [id number]       EKG      I personally viewed and interpreted the patient's EKG/Telemetry data: Atrial flutter with RVR.    ECHOCARDIOGRAM:    Results for orders  placed during the hospital encounter of 06/26/23    Adult Transthoracic Echo Complete W/ Cont if Necessary Per Protocol    Interpretation Summary    Left ventricular ejection fraction appears to be 56 - 60%.    Indications  Arrhythmia    Technically satisfactory study.  Mitral valve is structurally normal.  Tricuspid valve is structurally normal.  Aortic valve is structurally normal.  Pulmonic valve could not be well visualized.  No evidence for mitral tricuspid or aortic regurgitation is seen by Doppler study.  Left atrium is normal in size.  Right atrium is normal in size.  Left ventricle is normal in size and proximal inferior and posterior wall hypokinesis.  Right ventricle is normal in size.  Atrial septum is intact.  Aorta is normal.  No pericardial effusion or intracardiac thrombus is seen.    Impression  Structurally and functionally normal cardiac valves.  Left ventricular size and proximal posterior and inferior wall hypokinesis with ejection fraction of 60 %.      STRESS TEST  Results for orders placed during the hospital encounter of 06/26/23    Stress Test With Myocardial Perfusion One Day    Interpretation Summary  Indications  Palpitations    This study was performed under the direct supervision of Hanna GARCIA.    Resting ECG  Sinus rhythm    The patient was injected with Lexiscan intravenously while constantly monitoring electrocardiogram and vital signs.  Patient did not have any chest discomfort ST abnormalities or ectopy with injection of Lexiscan.    Cardiolite was used as an imaging agent.    Cardiolite images showed decreased radionuclide uptake in the posterior lateral segments with partial reperfusion.    Gated SPECT images revealed normal left ventricle size and contractility with ejection fraction of 56%.    Impression  ========  Lexiscan Cardiolite test is abnormal with posterolateral infarction and ischemia.    Gated SPECT images revealed normal left ventricular size and contractility with  ejection fraction of 56%.        Cardiolite (Tc-99m sestamibi) stress test    HEART CATHETERIZATION  Results for orders placed during the hospital encounter of 06/26/23    Cardiac Catheterization/Vascular Study    Narrative  CARDIAC CATHETERIZATION REPORT    DATE OF PROCEDURE:  6/28/2023    INDICATION FOR PROCEDURE:  Shortness of breath  A-fib with RVR  Abnormal stress Cardiolite test    PROCEDURE PERFORMED:  Left heart catheterization, coronary angiography, left ventriculography    @@  Moderate conscious sedation was utilized with intravenous Versed and fentanyl administered by registered nurse with continuous ECG, pulse oximetry and hemodynamic monitoring supervised by myself throughout the entire procedure.  Conscious sedation time   30 minutes    I was present with the patient for the duration of moderate sedation and supervised staff who had no other duties and monitored the patient for the entire procedure.    @@  PROCEDURE COMMENTS:    Under usual sterile precautions and 1% Xylocaine infiltration right femoral artery was percutaneously punctured and a 5 Portuguese vascular sheath was introduced into the right femoral artery.  Left and right coronary arteriography was performed in varying degrees of obliquity followed by left ventricular angiogram.  Patient has tortuosity of the right common iliac artery and all catheters were exchanged using exchange guidewire.  Hemostasis was obtained and patient was returned to the room with intact pulses.  No complications were noted.    FINDINGS:    1. HEMODYNAMICS:  Left ventricle end-diastolic pressure is normal.  No gradient was noted across the aortic valve.    2. LEFT VENTRICULOGRAPHY:  Left ventricular size and contractility is normal with ejection fraction of 60%.  No mitral regurgitation is present.    3. CORONARY ANGIOGRAPHY:  Left main coronary artery is normal.  Left anterior descending artery is normal.  Circumflex coronary artery is normal.  Right coronary artery  is a large and dominant vessel and is normal.    SUMMARY:  Normal left ventricular size and contractility with ejection fraction of 60%.  Normal epicardial coronary arteries.    RECOMMENDATIONS:  Noncardiac work-up.  Continue aspirin and beta-blocker for atrial fibrillation.  Anticoagulation versus 30-day event monitor to assess A-fib burden and consider anticoagulation.      OTHER:     Assessment & Plan     Active Problems:    * No active hospital problems. *      Assessment and Plan         ]]]]]]]]]]]]]]]]]]]]]  History  =============  - Patient has atrial flutter with RVR.    - History of atrial fibrillation with RVR.  Patient has increasing frequency of palpitations.  Patient had work-up in [place] approximately 2 years ago.  Patient was maintaining sinus rhythm.  Patient is now having paroxysmal atrial fibrillation     - Shortness of breath     - Mild elevation of troponin at 180-flat (high-sensitivity troponin)  EKG showed no acute changes     Echocardiogram-6/27/2023 showed proximal posterior and inferior hypokinesis.  Lexiscan Cardiolite test is abnormal with proximal posterior wall ischemia.  6/27/2023     Cardiac cath 6/28/2023 revealed normal left ventricle function and normal coronary arteries.     - Hypertension dyslipidemia     - Exogenous obesity     Status post bilateral knee replacements     - Non-smoker     - Occasional beer drinking     - Allergic to iodine  =============  Plan  ==============  Atrial flutter with RVR.  Patient was not responding to medications as outpatient.  Patient is on metoprolol and p.o. Cardizem.  Patient did not respond to intravenous Cardizem.  Patient was started on intravenous amiodarone.  Patient to have consideration for a flutter ablation.    Past history of history of A-fib with RVR.  Patient has converted to sinus rhythm.  Past history of A-fib ablation 2 years ago in [place].     Hypertension-124/87     Dyslipidemia-continue  atorvastatin    Anticoagulation-patient is on Eliquis.    Continue intravenous amiodarone.  Have discussed with Dr. Mcdowell regarding the ablation for atrial flutter.  Continue metoprolol and p.o. Cardizem.    Risks and benefits pros and cons of the procedure for atrial flutter was discussed.    Further plan will depend on patient's progress.    Reviewed and updated-9/13/2023  ]]]]]]]]]]]]]]]]]]]]]]             Greg Jade MD  09/13/23  18:25 EDT

## 2023-09-13 NOTE — ED PROVIDER NOTES
Subjective   History of Present Illness  Patient is a 58-year-old gentleman who states he has a history of atrial fibrillation and takes Eliquis daily he states that this episode started on Friday he states that its been consistent since then and he has palpitations associated with it.  He states he sees Dr. Jade.  He denies chest pain or shortness of breath    Review of Systems   Constitutional:  Negative for chills, fatigue and fever.   HENT:  Negative for congestion, tinnitus and trouble swallowing.    Eyes:  Negative for photophobia, discharge and redness.   Respiratory:  Negative for cough, chest tightness and shortness of breath.    Cardiovascular:  Positive for palpitations. Negative for chest pain.   Gastrointestinal:  Negative for abdominal pain, diarrhea, nausea and vomiting.   Genitourinary:  Negative for dysuria, frequency and urgency.   Musculoskeletal:  Negative for back pain, joint swelling and myalgias.   Skin:  Negative for rash.   Neurological:  Negative for dizziness and headaches.   Psychiatric/Behavioral:  Negative for confusion.    All other systems reviewed and are negative.    Past Medical History:   Diagnosis Date    Abnormal ECG 6/27/23    Arrhythmia 3/13/2021    Atrial fibrillation     HLD (hyperlipidemia)     Hypertension        Allergies   Allergen Reactions    Iodine Irritability     Topical irritation       Past Surgical History:   Procedure Laterality Date    CARDIAC CATHETERIZATION N/A 6/28/2023    Procedure: Left Heart Cath and coronary angiogram;  Surgeon: Greg Jade MD;  Location: [facility] CATH INVASIVE LOCATION;  Service: Cardiovascular;  Laterality: N/A;    KNEE ARTHROPLASTY         Family History   Problem Relation Age of Onset    Heart disease Mother     Heart disease Father     Heart attack Father        Social History     Socioeconomic History    Marital status:    Tobacco Use    Smoking status: Never     Passive exposure: Past (AS A CHILD)    Smokeless tobacco:  Never   Vaping Use    Vaping Use: Never used   Substance and Sexual Activity    Alcohol use: Yes     Alcohol/week: 12.0 standard drinks     Types: 12 Cans of beer per week     Comment: Beer on occasion    Drug use: Never    Sexual activity: Yes     Partners: Female     Birth control/protection: None           Objective   Physical Exam  Vitals reviewed.   Constitutional:       General: He is not in acute distress.     Appearance: He is well-developed. He is obese. He is not ill-appearing, toxic-appearing or diaphoretic.   HENT:      Head: Normocephalic and atraumatic.      Mouth/Throat:      Mouth: Mucous membranes are moist.   Eyes:      Conjunctiva/sclera: Conjunctivae normal.      Pupils: Pupils are equal, round, and reactive to light.   Cardiovascular:      Rate and Rhythm: Normal rate. Rhythm irregular.      Heart sounds: Normal heart sounds.   Pulmonary:      Effort: Pulmonary effort is normal.      Breath sounds: Normal breath sounds.   Abdominal:      General: Bowel sounds are normal.      Palpations: Abdomen is soft.   Musculoskeletal:         General: Normal range of motion.      Cervical back: Normal range of motion and neck supple.   Skin:     General: Skin is warm and dry.      Capillary Refill: Capillary refill takes less than 2 seconds.   Neurological:      General: No focal deficit present.      Mental Status: He is alert and oriented to person, place, and time.      GCS: GCS eye subscore is 4. GCS verbal subscore is 5. GCS motor subscore is 6.   Psychiatric:         Mood and Affect: Mood normal.         Behavior: Behavior normal.         Thought Content: Thought content normal.         Judgment: Judgment normal.       Procedures           EKG was interpreted by myself as well as Dr. Tamayo and felt to be atrial flutter was compared to previous dated 6/28/2023 when he was in sinus rhythm with a rate of 64  ED Course  ED Course as of 09/13/23 1858   Wed Sep 13, 2023   1616 Patient was given [facility]  "bolus with no results will be started on Cardizem drip [KW]   1724 On reevaluation after initial dose of Cardizem as well as a drip maxed out at 15-patient had no change in his rate he was still sitting around 145  Will be changed to the amnioderone [KW]   1819 Dr Dobbs  called back for Dr. Jade and states that the patient will likely need of lesion to admit him to the hospitalist and will leave him on amiodarone at this time [KW]   1828 Spoke to Dr. Merlos with the hospitalist who agreed to admit PCU level bed [KW]   1834 Dr. Jade called back and talked about this patient and states that he is trying to schedule   Ablation for tomorrow.   [KW]      ED Course User Index  [KW] Ila Gabriel, APRN           /93   Pulse (!) 148   Temp 98.3 °F (36.8 °C) (Oral)   Resp 18   Ht 190.5 cm (75\")   Wt 122 kg (268 lb 15.4 oz)   SpO2 98%   BMI 33.62 kg/m²   Labs Reviewed   COMPREHENSIVE METABOLIC PANEL - Abnormal; Notable for the following components:       Result Value    Glucose 101 (*)     All other components within normal limits    Narrative:     GFR Normal >60  Chronic Kidney Disease <60  Kidney Failure <15     BNP (IN-HOUSE) - Abnormal; Notable for the following components:    proBNP 1,230.0 (*)     All other components within normal limits    Narrative:     Among patients with dyspnea, NT-proBNP is highly sensitive for the detection of acute congestive heart failure. In addition NT-proBNP of <300 pg/ml effectively rules out acute congestive heart failure with 99% negative predictive value.     CBC WITH AUTO DIFFERENTIAL - Abnormal; Notable for the following components:    Lymphocyte % 18.9 (*)     All other components within normal limits   SINGLE HSTROPONIN T - Normal    Narrative:     High Sensitive Troponin T Reference Range:  <10.0 ng/L- Negative Female for AMI  <15.0 ng/L- Negative Male for AMI  >=10 - Abnormal Female indicating possible myocardial injury.  >=15 - Abnormal Male indicating possible " myocardial injury.   Clinicians would have to utilize clinical acumen, EKG, Troponin, and serial changes to determine if it is an Acute Myocardial Infarction or myocardial injury due to an underlying chronic condition.        CBC AND DIFFERENTIAL    Narrative:     The following orders were created for panel order CBC & Differential.  Procedure                               Abnormality         Status                     ---------                               -----------         ------                     CBC Auto Differential[[id number]]        Abnormal            Final result                 Please view results for these tests on the individual orders.   EXTRA TUBES    Narrative:     The following orders were created for panel order Extra Tubes.  Procedure                               Abnormality         Status                     ---------                               -----------         ------                     Gold Top - SST[[id number]]                                   Final result               Light Blue Top[[id number]]                                   Final result                 Please view results for these tests on the individual orders.   GOLD TOP - SST   LIGHT BLUE TOP     Medications   sodium chloride 0.9 % flush 10 mL (has no administration in time range)   amiodarone 150 mg in 100 mL D5W (loading dose) (0 mg Intravenous Stopped 9/13/23 1807)     Followed by   amiodarone 360 mg in 200 mL D5W infusion (1 mg/min Intravenous New Bag 9/13/23 1804)     Followed by   amiodarone 360 mg in 200 mL D5W infusion (has no administration in time range)   dilTIAZem (CARDIZEM) injection 20 mg (20 mg Intravenous Given 9/13/23 1551)   metoprolol tartrate (LOPRESSOR) injection 5 mg (5 mg Intravenous Given 9/13/23 1811)     XR Chest 1 View    Result Date: 9/13/2023  Impression: No evidence of acute cardiopulmonary disease. Electronically Signed: Flip Springer MD  9/13/2023 3:38 PM EDT  Workstation ID: [id number]                                    Medical Decision Making  Patient is a 58-year-old who comes in with palpitations and stating that he is in atrial fibrillation since Friday.  The patient had IV established and blood work was obtained CBC and chemistry were essentially normal as interpreted by myself the patient's troponin was negative.  The patient had an EKG which showed atrial flutter with a 2-1 rate as interpreted by myself as well as the ER physician the patient was given a Cardizem bolus and a Cardizem drip which was not responsive as then this was stopped and the patient was given an amiodarone bolus and drip and the patient had no response to his heart rate he was also given some Lopressor 5 mg IV which did not lower the heart rate I discussed this with the cardiologist Dr. Jade and Dr. Dobbs who both state that the patient is going to try to have an ablation tomorrow and they are working with Dr. Murphy to try to get this scheduled.  The patient is agreeable to admission he was discussed with the hospitalist Dr. Garcia and will be admitted and seen by the cardiologist in the morning    Patient is stable and pain-free at this time    Problems Addressed:  Palpitations: complicated acute illness or injury  Typical atrial flutter: complicated acute illness or injury    Amount and/or Complexity of Data Reviewed  External Data Reviewed: labs, ECG and notes.  Labs: ordered. Decision-making details documented in ED Course.  Radiology: ordered and independent interpretation performed. Decision-making details documented in ED Course.  ECG/medicine tests: ordered and independent interpretation performed. Decision-making details documented in ED Course.    Risk  OTC drugs.  Prescription drug management.  Decision regarding hospitalization.        Final diagnoses:   Palpitations   Typical atrial flutter       ED Disposition  ED Disposition       ED Disposition   Decision to Admit    Condition   --    Comment   Level of Care:  Telemetry [5]   Admitting Physician: DIONTE TURNER [[id number]]   Attending Physician: DIONTE TURNER [[id number]]   Bed Request Comments: PCU bed                 No follow-up provider specified.       Medication List        ASK your doctor about these medications      atorvastatin 40 MG tablet  Commonly known as: LIPITOR  Ask about: Which instructions should I use?                 Ila Gabriel, APRN  09/13/23 4770

## 2023-09-13 NOTE — H&P
[facility] Medicine Services  History & Physical    Patient Name: Nav Choudhary  : 1965  MRN: [id number]  Primary Care Physician:  Provider, No Known  Date of admission: 2023  Date and Time of Service: 2023 at 1935.    Subjective      Chief Complaint: Palpitations and some shortness of breath for about 6 days.    History of Present Illness: Nav Choudhary is a 58 y.o. male with known history of atrial flutter and hypertension who presented to [facility] on 2023 complaining of shortness of breath and palpitations for about 6 days and on his monitor he noticed that he has been in atrial flutter.  He went to Dr. Jade yesterday and he recommended him to come to the hospital but he was busy and he could not so he decided to come to the hospital today.  He denies any chest pain dizziness fever chills cough abdominal pain nausea vomiting diarrhea constipation hematemesis hematochezia melena hemoptysis or dysuria.      Review of Systems   Constitutional: Negative for chills, fever and night sweats.   HENT:  Negative for congestion, hoarse voice and sore throat.    Eyes:  Negative for blurred vision and double vision.   Cardiovascular:  Positive for dyspnea on exertion, irregular heartbeat and palpitations. Negative for chest pain, leg swelling, near-syncope, orthopnea and syncope.   Respiratory:  Positive for shortness of breath. Negative for cough, sputum production and wheezing.    Endocrine: Negative for cold intolerance and heat intolerance.   Skin:  Negative for itching and rash.   Musculoskeletal:  Negative for back pain, falls, neck pain and stiffness.   Gastrointestinal:  Negative for abdominal pain, constipation, diarrhea, hematemesis, hematochezia, melena, nausea and vomiting.   Genitourinary:  Negative for dysuria, frequency, hematuria and urgency.   Neurological:  Negative for excessive daytime sleepiness, dizziness, focal weakness, headaches, light-headedness,  loss of balance and weakness.   Psychiatric/Behavioral:  Negative for altered mental status, depression and hallucinations.         Personal History     Past Medical History:   Diagnosis Date    Abnormal ECG 6/27/23    Arrhythmia 3/13/2021    Atrial fibrillation     HLD (hyperlipidemia)     Hypertension        Past Surgical History:   Procedure Laterality Date    CARDIAC CATHETERIZATION N/A 6/28/2023    Procedure: Left Heart Cath and coronary angiogram;  Surgeon: Greg Jade MD;  Location: [facility] CATH INVASIVE LOCATION;  Service: Cardiovascular;  Laterality: N/A;    KNEE ARTHROPLASTY         Family History: family history includes Heart attack in his father; Heart disease in his father and mother. Otherwise pertinent FHx was reviewed and not pertinent to current issue.    Social History:  reports that he has never smoked. He has been exposed to tobacco smoke. He has never used smokeless tobacco. He reports current alcohol use of about 12.0 standard drinks per week. He reports that he does not use drugs.    Home Medications:  Prior to Admission Medications       Prescriptions Last Dose Informant Patient Reported? Taking?    acetaminophen (TYLENOL) 325 MG tablet   No Yes    Take 2 tablets by mouth Every 4 (Four) Hours As Needed for Mild Pain (temperature greater than 101F) for up to 90 days.    apixaban (ELIQUIS) 5 MG tablet tablet   No Yes    Take 1 tablet by mouth 2 (Two) Times a Day.    atorvastatin (LIPITOR) 40 MG tablet   Yes Yes    Take 1 tablet by mouth Daily.    dilTIAZem CD (CARDIZEM CD) 240 MG 24 hr capsule   No Yes    Take 1 capsule by mouth Daily.    metoprolol succinate XL (TOPROL-XL) 25 MG 24 hr tablet   No Yes    Take 2 tablets by mouth Every 12 (Twelve) Hours for 90 days.    nitroglycerin (NITROSTAT) 0.4 MG SL tablet   No Yes    Place 1 tablet under the tongue Every 5 (Five) Minutes As Needed for Chest Pain (Only if SBP Greater Than 100) for up to 90 days. Take no more than 3 doses in 15 minutes.               Allergies:  Allergies   Allergen Reactions    Iodine Irritability     Topical irritation       Objective      Vitals:   Temp:  [98.3 °F (36.8 °C)] 98.3 °F (36.8 °C)  Heart Rate:  [144-149] 148  Resp:  [16-18] 18  BP: (126-129)/(92-93) 129/93    Physical Exam  Constitutional:       General: He is not in acute distress.  HENT:      Head: Normocephalic and atraumatic.      Mouth/Throat:      Mouth: Mucous membranes are moist.      Pharynx: Oropharynx is clear.   Cardiovascular:      Rate and Rhythm: Regular rhythm. Tachycardia present.      Pulses: Normal pulses.      Heart sounds: Normal heart sounds.   Pulmonary:      Effort: Pulmonary effort is normal.      Breath sounds: Normal breath sounds.   Abdominal:      Palpations: Abdomen is soft.      Tenderness: There is no abdominal tenderness.   Musculoskeletal:      Right lower leg: Edema present.      Left lower leg: No edema.      Comments: Patient has chronic mild right lower extremity edema.   Skin:     General: Skin is warm and dry.   Neurological:      General: No focal deficit present.      Mental Status: He is alert.   Psychiatric:         Mood and Affect: Mood normal.         Behavior: Behavior normal.          Result Review    Result Review:  I have personally reviewed the results from the time of this admission to 9/13/2023 19:34 EDT and agree with these findings:  [x]  Laboratory  []  Microbiology  [x]  Radiology  [x]  EKG/Telemetry   []  Cardiology/Vascular   []  Pathology  []  Old records  []  Other:  Most notable findings include:   CBC is normal.  BMP is normal.  LFTs are normal.  Glucose is 101.  Troponin was 8.  proBNP was 1230.  Chest x-ray shows no evidence of acute cardiopulmonary disease.  EKG shows atrial flutter with 2-1 conduction.      Assessment & Plan        Active Hospital Problems:  Active Hospital Problems    Diagnosis     **Atrial flutter      Plan:   58-year-old white male with past medical history of atrial flutter off and  on for 2 years and hypertension comes in with palpitation and shortness of breath secondary to atrial flutter with 2-1 conduction.  He has been tried on multiple medications in the past but has not helped so at this point Dr. Hanson has recommended him to be started on amiodarone drip and then hopefully tomorrow he will do an ablation.    #Atrial flutter with 2-1 conduction:  Put in observation at PCU.  Continue amiodarone drip.  Cardiology consult with Dr. Hanson.  N.p.o. after midnight for possible ablation tomorrow.  We will continue home medications.    #Hypertension:  Blood pressure looks good at this point.  Continue home blood pressure medications.    Patient is full code.    I have utilized all available immediate resources to obtain, update, or review the patient's current medications.   I confirmed that the patient's Advance Care Plan is present, code status is documented, or surrogate decision maker is listed in the patient's medical record.       DVT prophylaxis:  Medical DVT prophylaxis orders are present.    CODE STATUS:    Level Of Support Discussed With: Patient  Code Status (Patient has no pulse and is not breathing): CPR (Attempt to Resuscitate)  Medical Interventions (Patient has pulse or is breathing): Full Support    Admission Status:  I believe this patient meets observation status.    I discussed the patient's findings and my recommendations with patient.      Signature: Electronically signed by Rhonda Garcia MD, 09/13/23, 19:34 EDT.  [facility] Hospitalist Team

## 2023-09-13 NOTE — Clinical Note
Level of Care: Stepdown [25]   Diagnosis: Atrial flutter [427.32.ICD-9-CM]   Admitting Physician: DIONTE TURNER [[id number]]   Attending Physician: DIONTE TURNER [[id number]]

## 2023-09-14 ENCOUNTER — ANESTHESIA EVENT (OUTPATIENT)
Dept: CARDIOLOGY | Facility: HOSPITAL | Age: 58
DRG: 274 | End: 2023-09-14
Payer: COMMERCIAL

## 2023-09-14 ENCOUNTER — ANESTHESIA (OUTPATIENT)
Dept: CARDIOLOGY | Facility: HOSPITAL | Age: 58
DRG: 274 | End: 2023-09-14
Payer: COMMERCIAL

## 2023-09-14 LAB
ANION GAP SERPL CALCULATED.3IONS-SCNC: 8 MMOL/L (ref 5–15)
BASOPHILS # BLD AUTO: 0 10*3/MM3 (ref 0–0.2)
BASOPHILS NFR BLD AUTO: 0.5 % (ref 0–1.5)
BUN SERPL-MCNC: 14 MG/DL (ref 6–20)
BUN/CREAT SERPL: 14.9 (ref 7–25)
CALCIUM SPEC-SCNC: 8.7 MG/DL (ref 8.6–10.5)
CHLORIDE SERPL-SCNC: 105 MMOL/L (ref 98–107)
CO2 SERPL-SCNC: 25 MMOL/L (ref 22–29)
CREAT SERPL-MCNC: 0.94 MG/DL (ref 0.76–1.27)
DEPRECATED RDW RBC AUTO: 46.4 FL (ref 37–54)
EGFRCR SERPLBLD CKD-EPI 2021: 95.1 ML/MIN/1.73
EOSINOPHIL # BLD AUTO: 0.2 10*3/MM3 (ref 0–0.4)
EOSINOPHIL NFR BLD AUTO: 2.3 % (ref 0.3–6.2)
ERYTHROCYTE [DISTWIDTH] IN BLOOD BY AUTOMATED COUNT: 14.8 % (ref 12.3–15.4)
GLUCOSE SERPL-MCNC: 107 MG/DL (ref 65–99)
HCT VFR BLD AUTO: 42.7 % (ref 37.5–51)
HGB BLD-MCNC: 14.3 G/DL (ref 13–17.7)
LYMPHOCYTES # BLD AUTO: 1.6 10*3/MM3 (ref 0.7–3.1)
LYMPHOCYTES NFR BLD AUTO: 21.4 % (ref 19.6–45.3)
MAGNESIUM SERPL-MCNC: 2.1 MG/DL (ref 1.6–2.6)
MCH RBC QN AUTO: 30.4 PG (ref 26.6–33)
MCHC RBC AUTO-ENTMCNC: 33.5 G/DL (ref 31.5–35.7)
MCV RBC AUTO: 90.9 FL (ref 79–97)
MONOCYTES # BLD AUTO: 0.7 10*3/MM3 (ref 0.1–0.9)
MONOCYTES NFR BLD AUTO: 9.7 % (ref 5–12)
NEUTROPHILS NFR BLD AUTO: 4.8 10*3/MM3 (ref 1.7–7)
NEUTROPHILS NFR BLD AUTO: 66.1 % (ref 42.7–76)
NRBC BLD AUTO-RTO: 0.1 /100 WBC (ref 0–0.2)
PLATELET # BLD AUTO: 190 10*3/MM3 (ref 140–450)
PMV BLD AUTO: 8.4 FL (ref 6–12)
POTASSIUM SERPL-SCNC: 4.5 MMOL/L (ref 3.5–5.2)
QT INTERVAL: 364 MS
QT INTERVAL: 427 MS
QTC INTERVAL: 482 MS
QTC INTERVAL: 567 MS
RBC # BLD AUTO: 4.69 10*6/MM3 (ref 4.14–5.8)
SODIUM SERPL-SCNC: 138 MMOL/L (ref 136–145)
TSH SERPL DL<=0.05 MIU/L-ACNC: 2.31 UIU/ML (ref 0.27–4.2)
WBC NRBC COR # BLD: 7.3 10*3/MM3 (ref 3.4–10.8)

## 2023-09-14 PROCEDURE — C1894 INTRO/SHEATH, NON-LASER: HCPCS | Performed by: INTERNAL MEDICINE

## 2023-09-14 PROCEDURE — C1759 CATH, INTRA ECHOCARDIOGRAPHY: HCPCS | Performed by: INTERNAL MEDICINE

## 2023-09-14 PROCEDURE — 85025 COMPLETE CBC W/AUTO DIFF WBC: CPT | Performed by: INTERNAL MEDICINE

## 2023-09-14 PROCEDURE — C1766 INTRO/SHEATH,STRBLE,NON-PEEL: HCPCS | Performed by: INTERNAL MEDICINE

## 2023-09-14 PROCEDURE — 02583ZZ DESTRUCTION OF CONDUCTION MECHANISM, PERCUTANEOUS APPROACH: ICD-10-PCS | Performed by: INTERNAL MEDICINE

## 2023-09-14 PROCEDURE — 25010000002 SUGAMMADEX 200 MG/2ML SOLUTION: Performed by: NURSE ANESTHETIST, CERTIFIED REGISTERED

## 2023-09-14 PROCEDURE — 93005 ELECTROCARDIOGRAM TRACING: CPT | Performed by: INTERNAL MEDICINE

## 2023-09-14 PROCEDURE — 0 LIDOCAINE 1 % SOLUTION: Performed by: INTERNAL MEDICINE

## 2023-09-14 PROCEDURE — 83735 ASSAY OF MAGNESIUM: CPT | Performed by: INTERNAL MEDICINE

## 2023-09-14 PROCEDURE — C1732 CATH, EP, DIAG/ABL, 3D/VECT: HCPCS | Performed by: INTERNAL MEDICINE

## 2023-09-14 PROCEDURE — 63710000001 PREDNISONE PER 5 MG: Performed by: NURSE PRACTITIONER

## 2023-09-14 PROCEDURE — 02K83ZZ MAP CONDUCTION MECHANISM, PERCUTANEOUS APPROACH: ICD-10-PCS | Performed by: INTERNAL MEDICINE

## 2023-09-14 PROCEDURE — 25010000002 ONDANSETRON PER 1 MG: Performed by: NURSE ANESTHETIST, CERTIFIED REGISTERED

## 2023-09-14 PROCEDURE — 25010000002 PROPOFOL 10 MG/ML EMULSION: Performed by: NURSE ANESTHETIST, CERTIFIED REGISTERED

## 2023-09-14 PROCEDURE — B244ZZ3 ULTRASONOGRAPHY OF RIGHT HEART, INTRAVASCULAR: ICD-10-PCS | Performed by: INTERNAL MEDICINE

## 2023-09-14 PROCEDURE — 25010000002 HEPARIN (PORCINE) PER 1000 UNITS: Performed by: NURSE ANESTHETIST, CERTIFIED REGISTERED

## 2023-09-14 PROCEDURE — 93653 COMPRE EP EVAL TX SVT: CPT | Performed by: INTERNAL MEDICINE

## 2023-09-14 PROCEDURE — 93662 INTRACARDIAC ECG (ICE): CPT | Performed by: INTERNAL MEDICINE

## 2023-09-14 PROCEDURE — 84443 ASSAY THYROID STIM HORMONE: CPT | Performed by: INTERNAL MEDICINE

## 2023-09-14 PROCEDURE — 25010000002 PHENYLEPHRINE 10 MG/ML SOLUTION 5 ML VIAL: Performed by: NURSE ANESTHETIST, CERTIFIED REGISTERED

## 2023-09-14 PROCEDURE — 25010000002 PHENYLEPHRINE 10 MG/ML SOLUTION: Performed by: NURSE ANESTHETIST, CERTIFIED REGISTERED

## 2023-09-14 PROCEDURE — 36415 COLL VENOUS BLD VENIPUNCTURE: CPT | Performed by: INTERNAL MEDICINE

## 2023-09-14 PROCEDURE — 4A0234Z MEASUREMENT OF CARDIAC ELECTRICAL ACTIVITY, PERCUTANEOUS APPROACH: ICD-10-PCS | Performed by: INTERNAL MEDICINE

## 2023-09-14 PROCEDURE — 25010000002 AMIODARONE IN DEXTROSE 5% 360-4.14 MG/200ML-% SOLUTION: Performed by: NURSE PRACTITIONER

## 2023-09-14 PROCEDURE — 25010000002 DEXAMETHASONE PER 1 MG: Performed by: NURSE ANESTHETIST, CERTIFIED REGISTERED

## 2023-09-14 PROCEDURE — 80048 BASIC METABOLIC PNL TOTAL CA: CPT | Performed by: INTERNAL MEDICINE

## 2023-09-14 PROCEDURE — C1730 CATH, EP, 19 OR FEW ELECT: HCPCS | Performed by: INTERNAL MEDICINE

## 2023-09-14 PROCEDURE — 4A023FZ MEASUREMENT OF CARDIAC RHYTHM, PERCUTANEOUS APPROACH: ICD-10-PCS | Performed by: INTERNAL MEDICINE

## 2023-09-14 RX ORDER — DIPHENHYDRAMINE HYDROCHLORIDE 50 MG/ML
50 INJECTION INTRAMUSCULAR; INTRAVENOUS
Status: DISCONTINUED | OUTPATIENT
Start: 2023-09-14 | End: 2023-09-14

## 2023-09-14 RX ORDER — FLECAINIDE ACETATE 50 MG/1
100 TABLET ORAL EVERY 12 HOURS SCHEDULED
Status: DISCONTINUED | OUTPATIENT
Start: 2023-09-14 | End: 2023-09-14

## 2023-09-14 RX ORDER — DEXAMETHASONE SODIUM PHOSPHATE 4 MG/ML
INJECTION, SOLUTION INTRA-ARTICULAR; INTRALESIONAL; INTRAMUSCULAR; INTRAVENOUS; SOFT TISSUE AS NEEDED
Status: DISCONTINUED | OUTPATIENT
Start: 2023-09-14 | End: 2023-09-14 | Stop reason: SURG

## 2023-09-14 RX ORDER — SODIUM CHLORIDE 9 MG/ML
INJECTION, SOLUTION INTRAVENOUS CONTINUOUS PRN
Status: DISCONTINUED | OUTPATIENT
Start: 2023-09-14 | End: 2023-09-14 | Stop reason: SURG

## 2023-09-14 RX ORDER — NALOXONE HCL 0.4 MG/ML
0.4 VIAL (ML) INJECTION AS NEEDED
Status: DISCONTINUED | OUTPATIENT
Start: 2023-09-14 | End: 2023-09-15 | Stop reason: HOSPADM

## 2023-09-14 RX ORDER — OXYCODONE HYDROCHLORIDE 5 MG/1
5 TABLET ORAL ONCE AS NEEDED
Status: DISCONTINUED | OUTPATIENT
Start: 2023-09-14 | End: 2023-09-14 | Stop reason: HOSPADM

## 2023-09-14 RX ORDER — LIDOCAINE HYDROCHLORIDE 10 MG/ML
INJECTION, SOLUTION INFILTRATION; PERINEURAL
Status: DISCONTINUED | OUTPATIENT
Start: 2023-09-14 | End: 2023-09-14 | Stop reason: HOSPADM

## 2023-09-14 RX ORDER — LABETALOL HYDROCHLORIDE 5 MG/ML
5 INJECTION, SOLUTION INTRAVENOUS
Status: DISCONTINUED | OUTPATIENT
Start: 2023-09-14 | End: 2023-09-14 | Stop reason: HOSPADM

## 2023-09-14 RX ORDER — FLECAINIDE ACETATE 50 MG/1
100 TABLET ORAL EVERY 12 HOURS SCHEDULED
Status: DISCONTINUED | OUTPATIENT
Start: 2023-09-14 | End: 2023-09-15 | Stop reason: HOSPADM

## 2023-09-14 RX ORDER — PHENYLEPHRINE HYDROCHLORIDE 10 MG/ML
INJECTION INTRAVENOUS AS NEEDED
Status: DISCONTINUED | OUTPATIENT
Start: 2023-09-14 | End: 2023-09-14 | Stop reason: SURG

## 2023-09-14 RX ORDER — PREDNISONE 50 MG/1
50 TABLET ORAL ONCE
Status: DISCONTINUED | OUTPATIENT
Start: 2023-09-14 | End: 2023-09-14 | Stop reason: HOSPADM

## 2023-09-14 RX ORDER — HYDROCHLOROTHIAZIDE 25 MG/1
25 TABLET ORAL DAILY
Status: COMPLETED | OUTPATIENT
Start: 2023-09-14 | End: 2023-09-14

## 2023-09-14 RX ORDER — DIPHENHYDRAMINE HYDROCHLORIDE 50 MG/ML
12.5 INJECTION INTRAMUSCULAR; INTRAVENOUS ONCE AS NEEDED
Status: DISCONTINUED | OUTPATIENT
Start: 2023-09-14 | End: 2023-09-14 | Stop reason: HOSPADM

## 2023-09-14 RX ORDER — HEPARIN SODIUM 1000 [USP'U]/ML
INJECTION, SOLUTION INTRAVENOUS; SUBCUTANEOUS AS NEEDED
Status: DISCONTINUED | OUTPATIENT
Start: 2023-09-14 | End: 2023-09-14 | Stop reason: SURG

## 2023-09-14 RX ORDER — ONDANSETRON 2 MG/ML
4 INJECTION INTRAMUSCULAR; INTRAVENOUS ONCE AS NEEDED
Status: DISCONTINUED | OUTPATIENT
Start: 2023-09-14 | End: 2023-09-14 | Stop reason: HOSPADM

## 2023-09-14 RX ORDER — DIPHENHYDRAMINE HYDROCHLORIDE 50 MG/ML
12.5 INJECTION INTRAMUSCULAR; INTRAVENOUS
Status: DISCONTINUED | OUTPATIENT
Start: 2023-09-14 | End: 2023-09-14 | Stop reason: HOSPADM

## 2023-09-14 RX ORDER — METOPROLOL SUCCINATE 50 MG/1
50 TABLET, EXTENDED RELEASE ORAL
Status: DISCONTINUED | OUTPATIENT
Start: 2023-09-15 | End: 2023-09-15 | Stop reason: HOSPADM

## 2023-09-14 RX ORDER — PROPOFOL 10 MG/ML
VIAL (ML) INTRAVENOUS AS NEEDED
Status: DISCONTINUED | OUTPATIENT
Start: 2023-09-14 | End: 2023-09-14 | Stop reason: SURG

## 2023-09-14 RX ORDER — MEPERIDINE HYDROCHLORIDE 25 MG/ML
12.5 INJECTION INTRAMUSCULAR; INTRAVENOUS; SUBCUTANEOUS
Status: DISCONTINUED | OUTPATIENT
Start: 2023-09-14 | End: 2023-09-15 | Stop reason: HOSPADM

## 2023-09-14 RX ORDER — OXYCODONE HYDROCHLORIDE 5 MG/1
10 TABLET ORAL EVERY 4 HOURS PRN
Status: DISCONTINUED | OUTPATIENT
Start: 2023-09-14 | End: 2023-09-14 | Stop reason: HOSPADM

## 2023-09-14 RX ORDER — NITROGLYCERIN 0.4 MG/1
0.4 TABLET SUBLINGUAL
Status: DISCONTINUED | OUTPATIENT
Start: 2023-09-14 | End: 2023-09-15 | Stop reason: HOSPADM

## 2023-09-14 RX ORDER — ROCURONIUM BROMIDE 10 MG/ML
INJECTION, SOLUTION INTRAVENOUS AS NEEDED
Status: DISCONTINUED | OUTPATIENT
Start: 2023-09-14 | End: 2023-09-14 | Stop reason: SURG

## 2023-09-14 RX ORDER — ONDANSETRON 2 MG/ML
INJECTION INTRAMUSCULAR; INTRAVENOUS AS NEEDED
Status: DISCONTINUED | OUTPATIENT
Start: 2023-09-14 | End: 2023-09-14 | Stop reason: SURG

## 2023-09-14 RX ORDER — IPRATROPIUM BROMIDE AND ALBUTEROL SULFATE 2.5; .5 MG/3ML; MG/3ML
3 SOLUTION RESPIRATORY (INHALATION) ONCE AS NEEDED
Status: DISCONTINUED | OUTPATIENT
Start: 2023-09-14 | End: 2023-09-14 | Stop reason: HOSPADM

## 2023-09-14 RX ORDER — EPHEDRINE SULFATE 5 MG/ML
5 INJECTION INTRAVENOUS ONCE AS NEEDED
Status: DISCONTINUED | OUTPATIENT
Start: 2023-09-14 | End: 2023-09-14 | Stop reason: HOSPADM

## 2023-09-14 RX ORDER — DIPHENHYDRAMINE HCL 25 MG
50 CAPSULE ORAL
Status: DISCONTINUED | OUTPATIENT
Start: 2023-09-14 | End: 2023-09-14

## 2023-09-14 RX ORDER — HYDRALAZINE HYDROCHLORIDE 20 MG/ML
5 INJECTION INTRAMUSCULAR; INTRAVENOUS
Status: DISCONTINUED | OUTPATIENT
Start: 2023-09-14 | End: 2023-09-14 | Stop reason: HOSPADM

## 2023-09-14 RX ORDER — PREDNISONE 50 MG/1
50 TABLET ORAL ONCE
Status: COMPLETED | OUTPATIENT
Start: 2023-09-14 | End: 2023-09-14

## 2023-09-14 RX ADMIN — METOPROLOL SUCCINATE 50 MG: 50 TABLET, EXTENDED RELEASE ORAL at 09:15

## 2023-09-14 RX ADMIN — PHENYLEPHRINE HYDROCHLORIDE 0.3 MCG/KG/MIN: 10 INJECTION INTRAVENOUS at 17:20

## 2023-09-14 RX ADMIN — PREDNISONE 50 MG: 50 TABLET ORAL at 10:43

## 2023-09-14 RX ADMIN — PHENYLEPHRINE HYDROCHLORIDE 100 MCG: 10 INJECTION INTRAVENOUS at 17:18

## 2023-09-14 RX ADMIN — PHENYLEPHRINE HYDROCHLORIDE 100 MCG: 10 INJECTION INTRAVENOUS at 17:29

## 2023-09-14 RX ADMIN — APIXABAN 5 MG: 5 TABLET, FILM COATED ORAL at 22:09

## 2023-09-14 RX ADMIN — DEXAMETHASONE SODIUM PHOSPHATE 8 MG: 4 INJECTION, SOLUTION INTRAMUSCULAR; INTRAVENOUS at 17:08

## 2023-09-14 RX ADMIN — AMIODARONE HYDROCHLORIDE 0.5 MG/MIN: 1.8 INJECTION, SOLUTION INTRAVENOUS at 10:43

## 2023-09-14 RX ADMIN — SUGAMMADEX 100 MG: 100 INJECTION, SOLUTION INTRAVENOUS at 17:56

## 2023-09-14 RX ADMIN — SUGAMMADEX 200 MG: 100 INJECTION, SOLUTION INTRAVENOUS at 17:53

## 2023-09-14 RX ADMIN — LIDOCAINE HYDROCHLORIDE 80 MG: 20 INJECTION, SOLUTION EPIDURAL; INFILTRATION; INTRACAUDAL; PERINEURAL at 16:47

## 2023-09-14 RX ADMIN — ONDANSETRON 4 MG: 2 INJECTION INTRAMUSCULAR; INTRAVENOUS at 17:49

## 2023-09-14 RX ADMIN — FLECAINIDE ACETATE 100 MG: 50 TABLET ORAL at 22:09

## 2023-09-14 RX ADMIN — Medication 10 ML: at 09:15

## 2023-09-14 RX ADMIN — HEPARIN SODIUM 5000 UNITS: 1000 INJECTION INTRAVENOUS; SUBCUTANEOUS at 17:09

## 2023-09-14 RX ADMIN — ATORVASTATIN CALCIUM 40 MG: 40 TABLET, FILM COATED ORAL at 09:15

## 2023-09-14 RX ADMIN — HYDROCHLOROTHIAZIDE 25 MG: 25 TABLET ORAL at 13:38

## 2023-09-14 RX ADMIN — PHENYLEPHRINE HYDROCHLORIDE 100 MCG: 10 INJECTION INTRAVENOUS at 17:15

## 2023-09-14 RX ADMIN — ROCURONIUM BROMIDE 20 MG: 10 INJECTION, SOLUTION INTRAVENOUS at 17:08

## 2023-09-14 RX ADMIN — PHENYLEPHRINE HYDROCHLORIDE 100 MCG: 10 INJECTION INTRAVENOUS at 17:30

## 2023-09-14 RX ADMIN — DILTIAZEM HYDROCHLORIDE 240 MG: 240 CAPSULE, EXTENDED RELEASE ORAL at 09:15

## 2023-09-14 RX ADMIN — ROCURONIUM BROMIDE 10 MG: 10 INJECTION, SOLUTION INTRAVENOUS at 17:29

## 2023-09-14 RX ADMIN — ROCURONIUM BROMIDE 50 MG: 10 INJECTION, SOLUTION INTRAVENOUS at 16:47

## 2023-09-14 RX ADMIN — SODIUM CHLORIDE: 9 INJECTION, SOLUTION INTRAVENOUS at 16:39

## 2023-09-14 RX ADMIN — Medication 10 ML: at 21:00

## 2023-09-14 RX ADMIN — PROPOFOL 200 MG: 10 INJECTION, EMULSION INTRAVENOUS at 16:47

## 2023-09-14 NOTE — PROGRESS NOTES
"    [facility] Medicine Services   Daily Progress Note    Patient Name: Nav Choudhary  : 1965  MRN: [id number]  Primary Care Physician:  Provider, No Known  Date of admission: 2023  Date and Time of Service: 23 at 1215      Subjective      Chief Complaint: Palpitations and some shortness of breath     Patient seen and examined today.  Patient states he continues to feel like his \"heart is racing\". He also notes some shortness of breath on exertion but he states he is \"used to it\". He denies any shortness of breath at rest, dizziness and denies any chest pain. Patient denies any other complaints at this time.     ROS 12 point ROS reviewed and negative except as mentioned above.      Objective      Vitals:   Temp:  [98.3 °F (36.8 °C)-98.4 °F (36.9 °C)] 98.4 °F (36.9 °C)  Heart Rate:  [125-149] 127  Resp:  [16-18] 18  BP: (113-149)/() 136/105    Physical Exam  Vitals and nursing note reviewed.   HENT:      Head: Normocephalic.   Eyes:      Extraocular Movements: Extraocular movements intact.      Pupils: Pupils are equal, round, and reactive to light.   Cardiovascular:      Rate and Rhythm: Regular rhythm. Tachycardia present.      Pulses: Normal pulses.   Pulmonary:      Effort: Pulmonary effort is normal.      Breath sounds: Normal breath sounds.   Abdominal:      General: Bowel sounds are normal.      Palpations: Abdomen is soft.   Musculoskeletal:         General: Normal range of motion.   Skin:     General: Skin is warm and dry.   Neurological:      Mental Status: He is alert and oriented to person, place, and time.   Psychiatric:         Mood and Affect: Mood normal.         Result Review    Result Review:  I have personally reviewed the results from the time of this admission to 2023 11:36 EDT and agree with these findings:  [x]  Laboratory  [x]  Microbiology  [x]  Radiology  [x]  EKG/Telemetry   []  Cardiology/Vascular   []  Pathology  []  Old records  []  Other:  Most " notable findings include:   CBC:      Lab 09/14/23  0415 09/13/23  1510   WBC 7.30 7.50   HEMOGLOBIN 14.3 14.7   HEMATOCRIT 42.7 43.9   PLATELETS 190 219   NEUTROS ABS 4.80 5.10   LYMPHS ABS 1.60 1.40   MONOS ABS 0.70 0.70   EOS ABS 0.20 0.10   MCV 90.9 89.9       CMP:        Lab 09/14/23  0755 09/14/23  0415 09/13/23  1510   SODIUM  --  138 140   POTASSIUM  --  4.5 4.0   CHLORIDE  --  105 107   CO2  --  25.0 22.0   ANION GAP  --  8.0 11.0   BUN  --  14 17   CREATININE  --  0.94 1.09   EGFR  --  95.1 79.7   GLUCOSE  --  107* 101*   CALCIUM  --  8.7 8.9   MAGNESIUM 2.1  --   --    TOTAL PROTEIN  --   --  6.1   ALBUMIN  --   --  4.0   GLOBULIN  --   --  2.1   ALT (SGPT)  --   --  22   AST (SGOT)  --   --  17   BILIRUBIN  --   --  0.4   ALK PHOS  --   --  81         Assessment & Plan      Brief Patient Summary:  Nav Choudhary is a 58 y.o. male with past medical history of atrial flutter off and on for 2 years and hypertension comes in with palpitation and shortness of breath secondary to atrial flutter with 2-1 conduction.        apixaban, 5 mg, Oral, BID  atorvastatin, 40 mg, Oral, Daily  dilTIAZem CD, 240 mg, Oral, Daily  diphenhydrAMINE, 50 mg, Oral, Once When Specified   Or  diphenhydrAMINE, 50 mg, Intravenous, Once When Specified   Or  diphenhydrAMINE, 50 mg, Intramuscular, Once When Specified  metoprolol succinate XL, 50 mg, Oral, Q12H  predniSONE, 50 mg, Oral, Once   Followed by  predniSONE, 50 mg, Oral, Once  senna-docusate sodium, 2 tablet, Oral, BID  sodium chloride, 10 mL, Intravenous, Q12H       amiodarone, 0.5 mg/min, Last Rate: 0.5 mg/min (09/14/23 1043)         Active Hospital Problems:  Active Hospital Problems    Diagnosis     **Atrial flutter     Atrial fibrillation with RVR     Essential hypertension      Plan:      Atrial flutter with 2-1 conduction:  -Patient denies chest pain  -Put in observation at PCU.  -Continue amiodarone drip.  -Cardiology consult with Dr. Jade-  continue IV amiodarone,  continue metoprolol and cardizem, per cardiology note  -Cardiac EP consult Dr. Mcdowell- plan for ablation, per his note.   -TSH 2.31  -Magnesium 2.1  -N.p.o. for possible ablation today  -We will continue home medications.     Hypertension:   Diastolic hypertension  -Blood pressure 136/105  -Continue home metoprolol and Cardizem  -Will give one dose of Hydrochlorothiazide 25mg to see if this improves his diastolic blood pressure   -Continue to monitor     DVT prophylaxis:  Medical DVT prophylaxis orders are present.    CODE STATUS:    Level Of Support Discussed With: Patient  Code Status (Patient has no pulse and is not breathing): CPR (Attempt to Resuscitate)  Medical Interventions (Patient has pulse or is breathing): Full Support      Disposition:  I expect patient to be discharged in 1-2 days.    This patient has been examined wearing appropriate Personal Protective Equipment and discussed with  attending physician Dr. Garcia . 09/14/23      Electronically signed by Viki Gonzalez PA-C, 09/14/23, 11:36 EDT.  [facility] Hospitalist Team

## 2023-09-14 NOTE — ANESTHESIA PREPROCEDURE EVALUATION
Anesthesia Evaluation     Patient summary reviewed and Nursing notes reviewed   NPO Solid Status: > 8 hours             Airway   Mallampati: II  TM distance: >3 FB  Neck ROM: full  No difficulty expected  Dental - normal exam     Pulmonary - negative pulmonary ROS and normal exam   (-) not a smoker  Cardiovascular - negative cardio ROS and normal exam    ECG reviewed    (+) hypertension, dysrhythmias Atrial Flutter, Atrial Fib, hyperlipidemia      Neuro/Psych- negative ROS  GI/Hepatic/Renal/Endo - negative ROS   (+) obesity    Musculoskeletal (-) negative ROS    Abdominal  - normal exam    Bowel sounds: normal.   Substance History - negative use     OB/GYN negative ob/gyn ROS         Other                      Anesthesia Plan    ASA 3     general       Anesthetic plan, risks, benefits, and alternatives have been provided, discussed and informed consent has been obtained with: patient.    CODE STATUS:    Level Of Support Discussed With: Patient  Code Status (Patient has no pulse and is not breathing): CPR (Attempt to Resuscitate)  Medical Interventions (Patient has pulse or is breathing): Full Support

## 2023-09-14 NOTE — CASE MANAGEMENT/SOCIAL WORK
Discharge Planning Assessment   [facility]     Patient Name: Nav Choudhary  MRN: [id number]  Today's Date: 9/14/2023    Admit Date: 9/13/2023    Plan: Home with family   Discharge Needs Assessment       Row Name 09/14/23 1208       Living Environment    People in Home spouse    Name(s) of People in Home Ella    Current Living Arrangements home    Potentially Unsafe Housing Conditions none    Primary Care Provided by self    Provides Primary Care For no one    Family Caregiver if Needed spouse    Family Caregiver Names Ella    Quality of Family Relationships supportive    Able to Return to Prior Arrangements yes       Resource/Environmental Concerns    Resource/Environmental Concerns none    Transportation Concerns none       Transition Planning    Patient/Family Anticipates Transition to home with family    Patient/Family Anticipated Services at Transition none    Transportation Anticipated car, drives self       Discharge Needs Assessment    Equipment Currently Used at Home none    Concerns to be Addressed denies needs/concerns at this time    Anticipated Changes Related to Illness none    Equipment Needed After Discharge none                   Discharge Plan       Row Name 09/14/23 0844       Plan    Plan Home with family    Patient/Family in Agreement with Plan yes    Plan Comments Pt reports he is IADLs. Doesn't have PCP. States he may be moving out of area, and doesn't plan to get set up with PCP here at this time. [facility] Patient Connection Hub phone number placed on patient's AVS with patient's permission. He would like enrolled in [facility], and this was updated in Epic. He denies difficulty obtaining medications or transportation. Spouse able to provide transportation if needed. He intends to return home at dc and denies dc needs. DC Barriers: Amiodarone gtt; EP study/ablation planned                  Continued Care and Services - Admitted Since 9/13/2023    Coordination has not been started for this  encounter.       Expected Discharge Date and Time       Expected Discharge Date Expected Discharge Time    Sep 15, 2023            Demographic Summary       Row Name 09/14/23 1208       General Information    Admission Type observation    Arrived From home    Referral Source admission list    Reason for Consult discharge planning    Preferred Language English       Contact Information    Permission Granted to Share Info With                    Functional Status       Row Name 09/14/23 1208       Functional Status    Usual Activity Tolerance good    Current Activity Tolerance good       Functional Status, IADL    Medications independent    Meal Preparation independent    Housekeeping independent    Laundry independent    Shopping independent                  Raquel Lucas RN, [facility]  Office: [phone]  Fax: [phone]  [email or web address]      I met with patient in room wearing PPE: mask and glasses     Maintained distance greater than six feet and spent </=15 minutes in the room         Raquel Lucas RN

## 2023-09-14 NOTE — CONSULTS
HP      Name: Nav Choudhary ADMIT: 2023   : 1965  PCP: Provider, No Known    MRN: [id number] LOS: 0 days   AGE/SEX: 58 y.o. male  ROOM:      Chief Complaint   Patient presents with    Irregular Heart Beat       Subjective        History of present illness  Nav Choudhary is a 58-year-old male patient who has no history of coronary artery disease, is admitted to the hospital for palpitations and tachycardia.  He was first diagnosed with atrial arrhythmias in 2023.    Past Medical History:   Diagnosis Date    Abnormal ECG 23    Arrhythmia 3/13/2021    Atrial fibrillation     HLD (hyperlipidemia)     Hypertension      Past Surgical History:   Procedure Laterality Date    CARDIAC CATHETERIZATION N/A 2023    Procedure: Left Heart Cath and coronary angiogram;  Surgeon: Greg Jade MD;  Location: [facility] C[facility];  Service: Cardiovascular;  Laterality: N/A;    KNEE ARTHROPLASTY       Family History   Problem Relation Age of Onset    Heart disease Mother     Heart disease Father     Heart attack Father      Social History     Tobacco Use    Smoking status: Never     Passive exposure: Past (AS A CHILD)    Smokeless tobacco: Never   Vaping Use    Vaping Use: Never used   Substance Use Topics    Alcohol use: Yes     Alcohol/week: 12.0 standard drinks     Types: 12 Cans of beer per week     Comment: Beer on occasion    Drug use: Never     (Not in a hospital admission)    Allergies:  Iodine    Review of systems    Constitutional: Negative.    Respiratory and cardiovascular: As detailed in HPI section.  Gastrointestinal: Negative for constipation, nausea and vomiting negative for abdominal distention, abdominal pain and diarrhea.   Genitourinary: Negative for difficulty urinating and flank pain.   Musculoskeletal: Negative for arthralgias, joint swelling and myalgias.   Skin: Negative for color change, rash and wound.   Neurological: Negative for dizziness, syncope, weakness  and headaches.   Hematological: Negative for adenopathy.   Psychiatric/Behavioral: Negative for confusion.   All other systems reviewed and are negative.       Physical Exam  VITALS REVIEWED    General:      well developed, in no acute distress.    Head:      normocephalic and atraumatic.    Eyes:      PERRL/EOM intact, conjunctiva and sclera clear with out nystagmus.    Neck:      no masses, thyromegaly,  trachea central with normal respiratory effort and PMI displaced laterally  Lungs:      Clear to auscultation bilaterally  Heart:       Irregular rhythm, tachycardic  Msk:      no deformity or scoliosis noted of thoracic or lumbar spine.    Pulses:      pulses normal in all 4 extremities.    Extremities:       No lower extremity edema  Neurologic:      no focal deficits.   alert oriented x3  Skin:      intact without lesions or rashes.    Psych:      alert and cooperative; normal mood and affect; normal attention span and concentration.      Result Review :               Pertinent cardiac workup    EKG 9/13/2023 atrial flutter ventricular rate of 146 bpm.  Event monitor 6/28/2023 for 25 days shows both atrial fibrillation and atrial flutter.  Heart cath 7/10/2023 normal coronaries  Echo 6/27/2023 ejection fraction 55 to 60%.        Assessment and Plan         Atrial flutter    Atrial fibrillation with RVR    Essential hypertension      Nav Choudhary is a 58-year-old male patient who was diagnosed with atrial flutter and atrial fibrillation few months back, is admitted to the hospital due to tachycardia.  It was paroxysmal at first on his event monitor in June 2023, however patient developed palpitations couple of days back and symptoms did not subside.  Who presented to the office and was found to be in atrial flutter at a rate of 150 bpm.  Patient was advised to come to the ER.  Here he remains in atrial flutter.  He is currently receiving IV amiodarone.  At home he had been on Toprol-XL, Cardizem and  Eliquis.  Plan for ablation.      No follow-ups on file.  Patient was given instructions and counseling regarding his condition or for health maintenance advice. Please see specific information pulled into the AVS if appropriate.

## 2023-09-14 NOTE — PLAN OF CARE
Goal Outcome Evaluation:              Outcome Evaluation: Recently recieved patient from ED, VS stable. No complaints. Patient cont on amio gtt, awaiting cath lab for scheduled ablation.

## 2023-09-14 NOTE — PROGRESS NOTES
Referring Provider: Rhonda Garcia MD    Reason for follow-up:  Atrial flutter with RVR     Patient Care Team:  Provider, No Known as PCP - General    Subjective .      ROS    Since I have last seen, the patient has been without any chest discomfort ,shortness of breath, palpitations, dizziness or syncope.  Denies having any headache ,abdominal pain ,nausea, vomiting , diarrhea constipation, loss of weight or loss of appetite.  Denies having any excessive bruising ,hematuria or blood in the stool.    Review of all systems negative except as indicated    History  Past Medical History:   Diagnosis Date    Abnormal ECG 6/27/23    Arrhythmia 3/13/2021    Atrial fibrillation     HLD (hyperlipidemia)     Hypertension        Past Surgical History:   Procedure Laterality Date    CARDIAC CATHETERIZATION N/A 6/28/2023    Procedure: Left Heart Cath and coronary angiogram;  Surgeon: Greg Jade MD;  Location: [facility] CATH INVASIVE LOCATION;  Service: Cardiovascular;  Laterality: N/A;    KNEE ARTHROPLASTY         Family History   Problem Relation Age of Onset    Heart disease Mother     Heart disease Father     Heart attack Father        Social History     Tobacco Use    Smoking status: Never     Passive exposure: Past (AS A CHILD)    Smokeless tobacco: Never   Vaping Use    Vaping Use: Never used   Substance Use Topics    Alcohol use: Yes     Alcohol/week: 12.0 standard drinks     Types: 12 Cans of beer per week     Comment: Beer on occasion    Drug use: Never        (Not in a hospital admission)      Allergies  Iodine    Scheduled Meds:apixaban, 5 mg, Oral, BID  atorvastatin, 40 mg, Oral, Daily  dilTIAZem CD, 240 mg, Oral, Daily  metoprolol succinate XL, 50 mg, Oral, Q12H  senna-docusate sodium, 2 tablet, Oral, BID  sodium chloride, 10 mL, Intravenous, Q12H      Continuous Infusions:amiodarone, 0.5 mg/min, Last Rate: 0.5 mg/min (09/14/23 0020)      PRN Meds:.  acetaminophen    senna-docusate sodium **AND** polyethylene  "glycol **AND** bisacodyl **AND** bisacodyl    nitroglycerin    ondansetron    sodium chloride    sodium chloride    sodium chloride    Objective     VITAL SIGNS  Vitals:    09/14/23 0430 09/14/23 0500 09/14/23 0530 09/14/23 0600   BP: (!) 137/101 (!) 138/109 138/99 130/95   BP Location: Left arm Left arm Left arm Left arm   Patient Position: Lying Lying Lying Lying   Pulse: (!) 127 (!) 127 (!) 125 (!) 126   Resp:       Temp:       TempSrc:       SpO2: 94% 92% 93% 93%   Weight:       Height:           Flowsheet Rows      Flowsheet Row First Filed Value   Admission Height 190.5 cm (75\") Documented at 09/13/2023 1438   Admission Weight 122 kg (268 lb 15.4 oz) Documented at 09/13/2023 1438            No intake or output data in the 24 hours ending 09/14/23 0630     TELEMETRY: Atrial flutter with RVR    Physical Exam:  The patient is alert, oriented and in no distress.  Vital signs as noted above.  Head and neck revealed no carotid bruits or jugular venous distention.  No thyromegaly or lymphadenopathy is present  Lungs clear.  No wheezing.  Breath sounds are normal bilaterally.  Heart normal first and second heart sounds.  No murmur. No precordial rub is present.  No gallop is present.  Abdomen soft and nontender.  No organomegaly is present.  Extremities with good peripheral pulses without any pedal edema.  Skin warm and dry.  Musculoskeletal system is grossly normal  CNS grossly normal    Reviewed and updated.    Results Review:   I reviewed the patient's new clinical results.  Lab Results (last 24 hours)       Procedure Component Value Units Date/Time    CBC Auto Differential [[id number]]  (Normal) Collected: 09/14/23 0415    Specimen: Blood Updated: 09/14/23 0532     WBC 7.30 10*3/mm3      RBC 4.69 10*6/mm3      Hemoglobin 14.3 g/dL      Hematocrit 42.7 %      MCV 90.9 fL      MCH 30.4 pg      MCHC 33.5 g/dL      RDW 14.8 %      RDW-SD 46.4 fl      MPV 8.4 fL      Platelets 190 10*3/mm3      Neutrophil % 66.1 %      " Lymphocyte % 21.4 %      Monocyte % 9.7 %      Eosinophil % 2.3 %      Basophil % 0.5 %      Neutrophils, Absolute 4.80 10*3/mm3      Lymphocytes, Absolute 1.60 10*3/mm3      Monocytes, Absolute 0.70 10*3/mm3      Eosinophils, Absolute 0.20 10*3/mm3      Basophils, Absolute 0.00 10*3/mm3      nRBC 0.1 /100 WBC     Basic Metabolic Panel [[id number]]  (Abnormal) Collected: 09/14/23 0415    Specimen: Blood Updated: 09/14/23 0531     Glucose 107 mg/dL      BUN 14 mg/dL      Creatinine 0.94 mg/dL      Sodium 138 mmol/L      Potassium 4.5 mmol/L      Comment: Specimen hemolyzed.  Results may be affected.        Chloride 105 mmol/L      CO2 25.0 mmol/L      Calcium 8.7 mg/dL      BUN/Creatinine Ratio 14.9     Anion Gap 8.0 mmol/L      eGFR 95.1 mL/min/1.73     Narrative:      GFR Normal >60  Chronic Kidney Disease <60  Kidney Failure <15      Extra Tubes [[id number]] Collected: 09/13/23 1510    Specimen: Blood, Venous Line Updated: 09/13/23 1615    Narrative:      The following orders were created for panel order Extra Tubes.  Procedure                               Abnormality         Status                     ---------                               -----------         ------                     Gold Top - SST[[id number]]                                   Final result               Light Blue Top[[id number]]                                   Final result                 Please view results for these tests on the individual orders.    Gold Top - SST [[id number]] Collected: 09/13/23 1510    Specimen: Blood Updated: 09/13/23 1615     Extra Tube Hold for add-ons.     Comment: Auto resulted.       Light Blue Top [[id number]] Collected: 09/13/23 1510    Specimen: Blood Updated: 09/13/23 1615     Extra Tube Hold for add-ons.     Comment: Auto resulted       Comprehensive Metabolic Panel [[id number]]  (Abnormal) Collected: 09/13/23 1510    Specimen: Blood Updated: 09/13/23 1543     Glucose 101 mg/dL      BUN 17 mg/dL      Creatinine  1.09 mg/dL      Sodium 140 mmol/L      Potassium 4.0 mmol/L      Chloride 107 mmol/L      CO2 22.0 mmol/L      Calcium 8.9 mg/dL      Total Protein 6.1 g/dL      Albumin 4.0 g/dL      ALT (SGPT) 22 U/L      AST (SGOT) 17 U/L      Alkaline Phosphatase 81 U/L      Total Bilirubin 0.4 mg/dL      Globulin 2.1 gm/dL      A/G Ratio 1.9 g/dL      BUN/Creatinine Ratio 15.6     Anion Gap 11.0 mmol/L      eGFR 79.7 mL/min/1.73     Narrative:      GFR Normal >60  Chronic Kidney Disease <60  Kidney Failure <15      Single High Sensitivity Troponin T [[id number]]  (Normal) Collected: 09/13/23 1510    Specimen: Blood Updated: 09/13/23 1543     HS Troponin T 8 ng/L     Narrative:      High Sensitive Troponin T Reference Range:  <10.0 ng/L- Negative Female for AMI  <15.0 ng/L- Negative Male for AMI  >=10 - Abnormal Female indicating possible myocardial injury.  >=15 - Abnormal Male indicating possible myocardial injury.   Clinicians would have to utilize clinical acumen, EKG, Troponin, and serial changes to determine if it is an Acute Myocardial Infarction or myocardial injury due to an underlying chronic condition.         BNP [[id number]]  (Abnormal) Collected: 09/13/23 1510    Specimen: Blood Updated: 09/13/23 1543     proBNP 1,230.0 pg/mL     Narrative:      Among patients with dyspnea, NT-proBNP is highly sensitive for the detection of acute congestive heart failure. In addition NT-proBNP of <300 pg/ml effectively rules out acute congestive heart failure with 99% negative predictive value.      CBC & Differential [[id number]]  (Abnormal) Collected: 09/13/23 1510    Specimen: Blood Updated: 09/13/23 1521    Narrative:      The following orders were created for panel order CBC & Differential.  Procedure                               Abnormality         Status                     ---------                               -----------         ------                     CBC Auto Differential[[id number]]        Abnormal            Final  result                 Please view results for these tests on the individual orders.    CBC Auto Differential [[id number]]  (Abnormal) Collected: 09/13/23 1510    Specimen: Blood Updated: 09/13/23 1521     WBC 7.50 10*3/mm3      RBC 4.88 10*6/mm3      Hemoglobin 14.7 g/dL      Hematocrit 43.9 %      MCV 89.9 fL      MCH 30.2 pg      MCHC 33.6 g/dL      RDW 14.8 %      RDW-SD 45.9 fl      MPV 8.2 fL      Platelets 219 10*3/mm3      Neutrophil % 68.3 %      Lymphocyte % 18.9 %      Monocyte % 9.8 %      Eosinophil % 2.0 %      Basophil % 1.0 %      Neutrophils, Absolute 5.10 10*3/mm3      Lymphocytes, Absolute 1.40 10*3/mm3      Monocytes, Absolute 0.70 10*3/mm3      Eosinophils, Absolute 0.10 10*3/mm3      Basophils, Absolute 0.10 10*3/mm3      nRBC 0.1 /100 WBC             Imaging Results (Last 24 Hours)       Procedure Component Value Units Date/Time    XR Chest 1 View [[id number]] Collected: 09/13/23 1537     Updated: 09/13/23 1540    Narrative:      XR CHEST 1 VW    Date of Exam: 9/13/2023 3:24 PM EDT    Indication: CHF/COPD Protocol CHF/COPD Protocol    Comparison: None available.    Findings:  Cardiomediastinal silhouette is within normal limits, considering portable technique. No focal consolidation or overt pulmonary edema. No pleural effusion or pneumothorax. Osseous structures are unremarkable.      Impression:      Impression:  No evidence of acute cardiopulmonary disease.    Electronically Signed: Flip Springer MD    9/13/2023 3:38 PM EDT    Workstation ID: [id number]        LAB RESULTS (LAST 7 DAYS)    CBC  Results from last 7 days   Lab Units 09/14/23  0415 09/13/23  1510   WBC 10*3/mm3 7.30 7.50   RBC 10*6/mm3 4.69 4.88   HEMOGLOBIN g/dL 14.3 14.7   HEMATOCRIT % 42.7 43.9   MCV fL 90.9 89.9   PLATELETS 10*3/mm3 190 219       BMP  Results from last 7 days   Lab Units 09/14/23  0415 09/13/23  1510   SODIUM mmol/L 138 140   POTASSIUM mmol/L 4.5 4.0   CHLORIDE mmol/L 105 107   CO2 mmol/L 25.0 22.0   BUN mg/dL  14 17   CREATININE mg/dL 0.94 1.09   GLUCOSE mg/dL 107* 101*       CMP   Results from last 7 days   Lab Units 09/14/23  0415 09/13/23  1510   SODIUM mmol/L 138 140   POTASSIUM mmol/L 4.5 4.0   CHLORIDE mmol/L 105 107   CO2 mmol/L 25.0 22.0   BUN mg/dL 14 17   CREATININE mg/dL 0.94 1.09   GLUCOSE mg/dL 107* 101*   ALBUMIN g/dL  --  4.0   BILIRUBIN mg/dL  --  0.4   ALK PHOS U/L  --  81   AST (SGOT) U/L  --  17   ALT (SGPT) U/L  --  22         BNP        TROPONIN  Results from last 7 days   Lab Units 09/13/23  1510   HSTROP T ng/L 8       CoAg        Creatinine Clearance  Estimated Creatinine Clearance: 123.5 mL/min (by C-G formula based on SCr of 0.94 mg/dL).    ABG        Radiology  XR Chest 1 View    Result Date: 9/13/2023  Impression: No evidence of acute cardiopulmonary disease. Electronically Signed: Flip Springer MD  9/13/2023 3:38 PM EDT  Workstation ID: [id number]         EKG      I personally viewed and interpreted the patient's EKG/Telemetry data:    ECHOCARDIOGRAM:    Results for orders placed during the hospital encounter of 06/26/23    Adult Transthoracic Echo Complete W/ Cont if Necessary Per Protocol    Interpretation Summary    Left ventricular ejection fraction appears to be 56 - 60%.    Indications  Arrhythmia    Technically satisfactory study.  Mitral valve is structurally normal.  Tricuspid valve is structurally normal.  Aortic valve is structurally normal.  Pulmonic valve could not be well visualized.  No evidence for mitral tricuspid or aortic regurgitation is seen by Doppler study.  Left atrium is normal in size.  Right atrium is normal in size.  Left ventricle is normal in size and proximal inferior and posterior wall hypokinesis.  Right ventricle is normal in size.  Atrial septum is intact.  Aorta is normal.  No pericardial effusion or intracardiac thrombus is seen.    Impression  Structurally and functionally normal cardiac valves.  Left ventricular size and proximal posterior and inferior wall  hypokinesis with ejection fraction of 60 %.          STRESS TEST  Results for orders placed during the hospital encounter of 06/26/23    Stress Test With Myocardial Perfusion One Day    Interpretation Summary  Indications  Palpitations    This study was performed under the direct supervision of Hanna GARCIA.    Resting ECG  Sinus rhythm    The patient was injected with Lexiscan intravenously while constantly monitoring electrocardiogram and vital signs.  Patient did not have any chest discomfort ST abnormalities or ectopy with injection of Lexiscan.    Cardiolite was used as an imaging agent.    Cardiolite images showed decreased radionuclide uptake in the posterior lateral segments with partial reperfusion.    Gated SPECT images revealed normal left ventricle size and contractility with ejection fraction of 56%.    Impression  ========  Lexiscan Cardiolite test is abnormal with posterolateral infarction and ischemia.    Gated SPECT images revealed normal left ventricular size and contractility with ejection fraction of 56%.        Cardiolite (Tc-99m sestamibi) stress test    CARDIAC CATHETERIZATION  Results for orders placed during the hospital encounter of 06/26/23    Cardiac Catheterization/Vascular Study    Narrative  CARDIAC CATHETERIZATION REPORT    DATE OF PROCEDURE:  6/28/2023    INDICATION FOR PROCEDURE:  Shortness of breath  A-fib with RVR  Abnormal stress Cardiolite test    PROCEDURE PERFORMED:  Left heart catheterization, coronary angiography, left ventriculography    @@  Moderate conscious sedation was utilized with intravenous Versed and fentanyl administered by registered nurse with continuous ECG, pulse oximetry and hemodynamic monitoring supervised by myself throughout the entire procedure.  Conscious sedation time   30 minutes    I was present with the patient for the duration of moderate sedation and supervised staff who had no other duties and monitored the patient for the entire  procedure.    @@  PROCEDURE COMMENTS:    Under usual sterile precautions and 1% Xylocaine infiltration right femoral artery was percutaneously punctured and a 5 [place] vascular sheath was introduced into the right femoral artery.  Left and right coronary arteriography was performed in varying degrees of obliquity followed by left ventricular angiogram.  Patient has tortuosity of the right common iliac artery and all catheters were exchanged using exchange guidewire.  Hemostasis was obtained and patient was returned to the room with intact pulses.  No complications were noted.    FINDINGS:    1. HEMODYNAMICS:  Left ventricle end-diastolic pressure is normal.  No gradient was noted across the aortic valve.    2. LEFT VENTRICULOGRAPHY:  Left ventricular size and contractility is normal with ejection fraction of 60%.  No mitral regurgitation is present.    3. CORONARY ANGIOGRAPHY:  Left main coronary artery is normal.  Left anterior descending artery is normal.  Circumflex coronary artery is normal.  Right coronary artery is a large and dominant vessel and is normal.    SUMMARY:  Normal left ventricular size and contractility with ejection fraction of 60%.  Normal epicardial coronary arteries.    RECOMMENDATIONS:  Noncardiac work-up.  Continue aspirin and beta-blocker for atrial fibrillation.  Anticoagulation versus 30-day event monitor to assess A-fib burden and consider anticoagulation.                OTHER:         Assessment & Plan     Principal Problem:    Atrial flutter      ]]]]]]]]]]]]]]]]]]]]  History  =============  - Patient has atrial flutter with RVR.    - History of atrial fibrillation with RVR.  Patient has increasing frequency of palpitations.  Patient had work-up in [place] approximately 2 years ago.  Patient was maintaining sinus rhythm.  Patient is now having paroxysmal atrial fibrillation     - Shortness of breath     - Mild elevation of troponin at 180-flat (high-sensitivity troponin)  EKG showed  no acute changes     Echocardiogram-6/27/2023 showed proximal posterior and inferior hypokinesis.  Lexiscan Cardiolite test is abnormal with proximal posterior wall ischemia.  6/27/2023     Cardiac cath 6/28/2023 revealed normal left ventricle function and normal coronary arteries.     - Hypertension dyslipidemia     - Exogenous obesity     Status post bilateral knee replacements     - Non-smoker     - Occasional beer drinking     - Allergic to iodine  =============  Plan  ==============  Atrial flutter with RVR.  Patient was not responding to medications as outpatient.  Patient is on metoprolol and p.o. Cardizem.  Patient did not respond to intravenous Cardizem.  Patient was started on intravenous amiodarone.  Patient to have consideration for a flutter ablation.    Past history of history of A-fib with RVR.  Patient has converted to sinus rhythm.  Past history of A-fib ablation 2 years ago in [place].     Hypertension-124/87     Dyslipidemia-continue atorvastatin    Anticoagulation-patient is on Eliquis.    Check TSH and magnesium level.    Continue intravenous amiodarone.  Have discussed with Dr. Mcdowell regarding the ablation for atrial flutter.  Continue metoprolol and p.o. Cardizem.  Patient is scheduled for atrial flutter ablation today.    Risks and benefits pros and cons of the procedure for atrial flutter was discussed.    Further plan will depend on patient's progress.    Reviewed and updated-9/14/2023  ]]]]]]]]]]]]]]]]]]]]]]              Greg Jade MD  09/14/23  06:30 EDT

## 2023-09-14 NOTE — ANESTHESIA POSTPROCEDURE EVALUATION
Patient: Nav Choudhary    Procedure Summary       Date: 09/14/23 Room / Location: [place] CATH LAB 3 / [facility] CATH INVASIVE LOCATION    Anesthesia Start: 1639 Anesthesia Stop: 1815    Procedure: AFib/Flutter Ablation (Groin) Diagnosis:       Typical atrial flutter      Atrial fibrillation with RVR      (atrial fibrillation, atrial flutter)    Providers: Andrade Mcdowell MD Provider: Armaan Olivas MD    Anesthesia Type: general ASA Status: 3            Anesthesia Type: general    Vitals  Vitals Value Taken Time   /85 09/14/23 1904   Temp 98.2 °F (36.8 °C) 09/14/23 1840   Pulse 66 09/14/23 1914   Resp 12 09/14/23 1845   SpO2 95 % 09/14/23 1914   Vitals shown include unvalidated device data.        Post Anesthesia Care and Evaluation    Patient location during evaluation: PACU  Patient participation: complete - patient participated  Level of consciousness: awake  Pain score: 0  Pain management: adequate  Anesthetic complications: No anesthetic complications  PONV Status: none  Cardiovascular status: acceptable  Respiratory status: acceptable  Hydration status: acceptable

## 2023-09-14 NOTE — PAYOR COMM NOTE
"    CLINICAL SUPPORT - REF #   [id number]     Please advise if additional clinical is needed to process this request.  Thank you!    Joyce Crook  Utilization Review Coordinator  [facility]  [address] [place] [address]  [id number]  Ph: [phone]  Fx: [phone]    [id number]   FAX: [phone]     Les Pike (58 y.o. Male)       Date of Birth   1965    Social Security Number       Address   [address] [place] [id number]    Home Phone   [phone]    MRN   [id number]       Baptism   None    Marital Status                               Admission Date   23    Admission Type   Emergency    Admitting Provider   Rhonda Garcia MD    Attending Provider   Rhonda Garcia MD    Department, Room/Bed   [facility] EMERGENCY DEPARTMENT,        Discharge Date       Discharge Disposition       Discharge Destination                                 Attending Provider: Rhonda Garcia MD    Allergies: Iodine    Isolation: None   Infection: None   Code Status: CPR    Ht: 190.5 cm (75\")   Wt: 122 kg (268 lb 15.4 oz)    Admission Cmt: None   Principal Problem: Atrial flutter [I48.92]                   Active Insurance as of 2023       Primary Coverage       Payor Plan Insurance Group Employer/Plan Group    [employer] [facility] PPO [id number]       Payor Plan Address Payor Plan Phone Number Payor Plan Fax Number Effective Dates    [address] [phone]  2021 - None Entered    [place] [address]         Subscriber Name Subscriber Birth Date Member ID       LES PIKE 1965 [id number]                     Emergency Contacts        (Rel.) Home Phone Work Phone Mobile Phone    NICHO PIKE (Spouse) -- -- [phone]                 History & Physical        Rhonda Garcia MD at 23 1934              [facility] Medicine Services  History & Physical    Patient Name: Les Pike  : " 1965  MRN: [id number]  Primary Care Physician:  Provider, No Known  Date of admission: 9/13/2023  Date and Time of Service: 9/13/2023 at 1935.    Subjective      Chief Complaint: Palpitations and some shortness of breath for about 6 days.    History of Present Illness: Nav Choudhary is a 58 y.o. male with known history of atrial flutter and hypertension who presented to [facility] on 9/13/2023 complaining of shortness of breath and palpitations for about 6 days and on his monitor he noticed that he has been in atrial flutter.  He went to Dr. Jade yesterday and he recommended him to come to the hospital but he was busy and he could not so he decided to come to the hospital today.  He denies any chest pain dizziness fever chills cough abdominal pain nausea vomiting diarrhea constipation hematemesis hematochezia melena hemoptysis or dysuria.      Review of Systems   Constitutional: Negative for chills, fever and night sweats.   HENT:  Negative for congestion, hoarse voice and sore throat.    Eyes:  Negative for blurred vision and double vision.   Cardiovascular:  Positive for dyspnea on exertion, irregular heartbeat and palpitations. Negative for chest pain, leg swelling, near-syncope, orthopnea and syncope.   Respiratory:  Positive for shortness of breath. Negative for cough, sputum production and wheezing.    Endocrine: Negative for cold intolerance and heat intolerance.   Skin:  Negative for itching and rash.   Musculoskeletal:  Negative for back pain, falls, neck pain and stiffness.   Gastrointestinal:  Negative for abdominal pain, constipation, diarrhea, hematemesis, hematochezia, melena, nausea and vomiting.   Genitourinary:  Negative for dysuria, frequency, hematuria and urgency.   Neurological:  Negative for excessive daytime sleepiness, dizziness, focal weakness, headaches, light-headedness, loss of balance and weakness.   Psychiatric/Behavioral:  Negative for altered mental status,  depression and hallucinations.         Personal History     Past Medical History:   Diagnosis Date    Abnormal ECG 6/27/23    Arrhythmia 3/13/2021    Atrial fibrillation     HLD (hyperlipidemia)     Hypertension        Past Surgical History:   Procedure Laterality Date    CARDIAC CATHETERIZATION N/A 6/28/2023    Procedure: Left Heart Cath and coronary angiogram;  Surgeon: Greg Jade MD;  Location: [facility] CATH INVASIVE LOCATION;  Service: Cardiovascular;  Laterality: N/A;    KNEE ARTHROPLASTY         Family History: family history includes Heart attack in his father; Heart disease in his father and mother. Otherwise pertinent FHx was reviewed and not pertinent to current issue.    Social History:  reports that he has never smoked. He has been exposed to tobacco smoke. He has never used smokeless tobacco. He reports current alcohol use of about 12.0 standard drinks per week. He reports that he does not use drugs.    Home Medications:  Prior to Admission Medications       Prescriptions Last Dose Informant Patient Reported? Taking?    acetaminophen (TYLENOL) 325 MG tablet   No Yes    Take 2 tablets by mouth Every 4 (Four) Hours As Needed for Mild Pain (temperature greater than 101F) for up to 90 days.    apixaban (ELIQUIS) 5 MG tablet tablet   No Yes    Take 1 tablet by mouth 2 (Two) Times a Day.    atorvastatin (LIPITOR) 40 MG tablet   Yes Yes    Take 1 tablet by mouth Daily.    dilTIAZem CD (CARDIZEM CD) 240 MG 24 hr capsule   No Yes    Take 1 capsule by mouth Daily.    metoprolol succinate XL (TOPROL-XL) 25 MG 24 hr tablet   No Yes    Take 2 tablets by mouth Every 12 (Twelve) Hours for 90 days.    nitroglycerin (NITROSTAT) 0.4 MG SL tablet   No Yes    Place 1 tablet under the tongue Every 5 (Five) Minutes As Needed for Chest Pain (Only if SBP Greater Than 100) for up to 90 days. Take no more than 3 doses in 15 minutes.              Allergies:  Allergies   Allergen Reactions    Iodine Irritability     Topical  irritation       Objective      Vitals:   Temp:  [98.3 °F (36.8 °C)] 98.3 °F (36.8 °C)  Heart Rate:  [144-149] 148  Resp:  [16-18] 18  BP: (126-129)/(92-93) 129/93    Physical Exam  Constitutional:       General: He is not in acute distress.  HENT:      Head: Normocephalic and atraumatic.      Mouth/Throat:      Mouth: Mucous membranes are moist.      Pharynx: Oropharynx is clear.   Cardiovascular:      Rate and Rhythm: Regular rhythm. Tachycardia present.      Pulses: Normal pulses.      Heart sounds: Normal heart sounds.   Pulmonary:      Effort: Pulmonary effort is normal.      Breath sounds: Normal breath sounds.   Abdominal:      Palpations: Abdomen is soft.      Tenderness: There is no abdominal tenderness.   Musculoskeletal:      Right lower leg: Edema present.      Left lower leg: No edema.      Comments: Patient has chronic mild right lower extremity edema.   Skin:     General: Skin is warm and dry.   Neurological:      General: No focal deficit present.      Mental Status: He is alert.   Psychiatric:         Mood and Affect: Mood normal.         Behavior: Behavior normal.          Result Review    Result Review:  I have personally reviewed the results from the time of this admission to 9/13/2023 19:34 EDT and agree with these findings:  [x]  Laboratory  []  Microbiology  [x]  Radiology  [x]  EKG/Telemetry   []  Cardiology/Vascular   []  Pathology  []  Old records  []  Other:  Most notable findings include:   CBC is normal.  BMP is normal.  LFTs are normal.  Glucose is 101.  Troponin was 8.  proBNP was 1230.  Chest x-ray shows no evidence of acute cardiopulmonary disease.  EKG shows atrial flutter with 2-1 conduction.      Assessment & Plan        Active Hospital Problems:  Active Hospital Problems    Diagnosis     **Atrial flutter      Plan:   58-year-old white male with past medical history of atrial flutter off and on for 2 years and hypertension comes in with palpitation and shortness of breath  secondary to atrial flutter with 2-1 conduction.  He has been tried on multiple medications in the past but has not helped so at this point Dr. Hanson has recommended him to be started on amiodarone drip and then hopefully tomorrow he will do an ablation.    #Atrial flutter with 2-1 conduction:  Put in observation at PCU.  Continue amiodarone drip.  Cardiology consult with Dr. Hanson.  N.p.o. after midnight for possible ablation tomorrow.  We will continue home medications.    #Hypertension:  Blood pressure looks good at this point.  Continue home blood pressure medications.    Patient is full code.    I have utilized all available immediate resources to obtain, update, or review the patient's current medications.   I confirmed that the patient's Advance Care Plan is present, code status is documented, or surrogate decision maker is listed in the patient's medical record.       DVT prophylaxis:  Medical DVT prophylaxis orders are present.    CODE STATUS:    Level Of Support Discussed With: Patient  Code Status (Patient has no pulse and is not breathing): CPR (Attempt to Resuscitate)  Medical Interventions (Patient has pulse or is breathing): Full Support    Admission Status:  I believe this patient meets observation status.    I discussed the patient's findings and my recommendations with patient.      Signature: Electronically signed by Rhonda Gacria MD, 09/13/23, 19:34 EDT.  [facility] Hospitalist Team    Electronically signed by Rhonda Garcia MD at 09/13/23 1941          Emergency Department Notes        Divina Sanders at 09/14/23 0707          EKG requested       Electronically signed by Divina Sanders at 09/14/23 0707       Ila Gabriel, NORM at 09/13/23 1505          Subjective   History of Present Illness  Patient is a 58-year-old gentleman who states he has a history of atrial fibrillation and takes Eliquis daily he states that this episode started on Friday he states that its been consistent  since then and he has palpitations associated with it.  He states he sees Dr. Jade.  He denies chest pain or shortness of breath    Review of Systems   Constitutional:  Negative for chills, fatigue and fever.   HENT:  Negative for congestion, tinnitus and trouble swallowing.    Eyes:  Negative for photophobia, discharge and redness.   Respiratory:  Negative for cough, chest tightness and shortness of breath.    Cardiovascular:  Positive for palpitations. Negative for chest pain.   Gastrointestinal:  Negative for abdominal pain, diarrhea, nausea and vomiting.   Genitourinary:  Negative for dysuria, frequency and urgency.   Musculoskeletal:  Negative for back pain, joint swelling and myalgias.   Skin:  Negative for rash.   Neurological:  Negative for dizziness and headaches.   Psychiatric/Behavioral:  Negative for confusion.    All other systems reviewed and are negative.    Past Medical History:   Diagnosis Date    Abnormal ECG 6/27/23    Arrhythmia 3/13/2021    Atrial fibrillation     HLD (hyperlipidemia)     Hypertension        Allergies   Allergen Reactions    Iodine Irritability     Topical irritation       Past Surgical History:   Procedure Laterality Date    CARDIAC CATHETERIZATION N/A 6/28/2023    Procedure: Left Heart Cath and coronary angiogram;  Surgeon: Greg Jade MD;  Location: [facility] CATH INVASIVE LOCATION;  Service: Cardiovascular;  Laterality: N/A;    KNEE ARTHROPLASTY         Family History   Problem Relation Age of Onset    Heart disease Mother     Heart disease Father     Heart attack Father        Social History     Socioeconomic History    Marital status:    Tobacco Use    Smoking status: Never     Passive exposure: Past (AS A CHILD)    Smokeless tobacco: Never   Vaping Use    Vaping Use: Never used   Substance and Sexual Activity    Alcohol use: Yes     Alcohol/week: 12.0 standard drinks     Types: 12 Cans of beer per week     Comment: Beer on occasion    Drug use: Never    Sexual  activity: Yes     Partners: Female     Birth control/protection: None           Objective   Physical Exam  Vitals reviewed.   Constitutional:       General: He is not in acute distress.     Appearance: He is well-developed. He is obese. He is not ill-appearing, toxic-appearing or diaphoretic.   HENT:      Head: Normocephalic and atraumatic.      Mouth/Throat:      Mouth: Mucous membranes are moist.   Eyes:      Conjunctiva/sclera: Conjunctivae normal.      Pupils: Pupils are equal, round, and reactive to light.   Cardiovascular:      Rate and Rhythm: Normal rate. Rhythm irregular.      Heart sounds: Normal heart sounds.   Pulmonary:      Effort: Pulmonary effort is normal.      Breath sounds: Normal breath sounds.   Abdominal:      General: Bowel sounds are normal.      Palpations: Abdomen is soft.   Musculoskeletal:         General: Normal range of motion.      Cervical back: Normal range of motion and neck supple.   Skin:     General: Skin is warm and dry.      Capillary Refill: Capillary refill takes less than 2 seconds.   Neurological:      General: No focal deficit present.      Mental Status: He is alert and oriented to person, place, and time.      GCS: GCS eye subscore is 4. GCS verbal subscore is 5. GCS motor subscore is 6.   Psychiatric:         Mood and Affect: Mood normal.         Behavior: Behavior normal.         Thought Content: Thought content normal.         Judgment: Judgment normal.       Procedures          EKG was interpreted by myself as well as Dr. Tamayo and felt to be atrial flutter was compared to previous dated 6/28/2023 when he was in sinus rhythm with a rate of 64  ED Course  ED Course as of 09/13/23 1858   Wed Sep 13, 2023   1616 Patient was given Cardizem bolus with no results will be started on Cardizem drip [KW]   1724 On reevaluation after initial dose of Cardizem as well as a drip maxed out at 15-patient had no change in his rate he was still sitting around 145  Will be changed to  "the amnioderone [KW]   1819 Dr Dobbs  called back for Dr. Jade and states that the patient will likely need of lesion to admit him to the hospitalist and will leave him on amiodarone at this time [KW]   1828 Spoke to Dr. Merlos with the hospitalist who agreed to admit PCU level bed [KW]   1834 Dr. Jade called back and talked about this patient and states that he is trying to schedule   Ablation for tomorrow.   [KW]      ED Course User Index  [KW] Ila Gabriel, APRN           /93   Pulse (!) 148   Temp 98.3 °F (36.8 °C) (Oral)   Resp 18   Ht 190.5 cm (75\")   Wt 122 kg (268 lb 15.4 oz)   SpO2 98%   BMI 33.62 kg/m²   Labs Reviewed   COMPREHENSIVE METABOLIC PANEL - Abnormal; Notable for the following components:       Result Value    Glucose 101 (*)     All other components within normal limits    Narrative:     GFR Normal >60  Chronic Kidney Disease <60  Kidney Failure <15     BNP (IN-HOUSE) - Abnormal; Notable for the following components:    proBNP 1,230.0 (*)     All other components within normal limits    Narrative:     Among patients with dyspnea, NT-proBNP is highly sensitive for the detection of acute congestive heart failure. In addition NT-proBNP of <300 pg/ml effectively rules out acute congestive heart failure with 99% negative predictive value.     CBC WITH AUTO DIFFERENTIAL - Abnormal; Notable for the following components:    Lymphocyte % 18.9 (*)     All other components within normal limits   SINGLE HSTROPONIN T - Normal    Narrative:     High Sensitive Troponin T Reference Range:  <10.0 ng/L- Negative Female for AMI  <15.0 ng/L- Negative Male for AMI  >=10 - Abnormal Female indicating possible myocardial injury.  >=15 - Abnormal Male indicating possible myocardial injury.   Clinicians would have to utilize clinical acumen, EKG, Troponin, and serial changes to determine if it is an Acute Myocardial Infarction or myocardial injury due to an underlying chronic condition.        CBC AND " DIFFERENTIAL    Narrative:     The following orders were created for panel order CBC & Differential.  Procedure                               Abnormality         Status                     ---------                               -----------         ------                     CBC Auto Differential[[id number]]        Abnormal            Final result                 Please view results for these tests on the individual orders.   EXTRA TUBES    Narrative:     The following orders were created for panel order Extra Tubes.  Procedure                               Abnormality         Status                     ---------                               -----------         ------                     Gold Top - SST[[id number]]                                   Final result               Light Blue Top[[id number]]                                   Final result                 Please view results for these tests on the individual orders.   GOLD TOP - SST   LIGHT BLUE TOP     Medications   sodium chloride 0.9 % flush 10 mL (has no administration in time range)   amiodarone 150 mg in 100 mL D5W (loading dose) (0 mg Intravenous Stopped 9/13/23 1807)     Followed by   amiodarone 360 mg in 200 mL D5W infusion (1 mg/min Intravenous New Bag 9/13/23 1804)     Followed by   amiodarone 360 mg in 200 mL D5W infusion (has no administration in time range)   dilTIAZem (CARDIZEM) injection 20 mg (20 mg Intravenous Given 9/13/23 1551)   metoprolol tartrate (LOPRESSOR) injection 5 mg (5 mg Intravenous Given 9/13/23 1811)     XR Chest 1 View    Result Date: 9/13/2023  Impression: No evidence of acute cardiopulmonary disease. Electronically Signed: Flip Springer MD  9/13/2023 3:38 PM EDT  Workstation ID: [id number]                                   Medical Decision Making  Patient is a 58-year-old who comes in with palpitations and stating that he is in atrial fibrillation since Friday.  The patient had IV established and blood work was obtained CBC and  chemistry were essentially normal as interpreted by myself the patient's troponin was negative.  The patient had an EKG which showed atrial flutter with a 2-1 rate as interpreted by myself as well as the ER physician the patient was given a Cardizem bolus and a Cardizem drip which was not responsive as then this was stopped and the patient was given an amiodarone bolus and drip and the patient had no response to his heart rate he was also given some Lopressor 5 mg IV which did not lower the heart rate I discussed this with the cardiologist Dr. Jade and Dr. Dobbs who both state that the patient is going to try to have an ablation tomorrow and they are working with Dr. Murphy to try to get this scheduled.  The patient is agreeable to admission he was discussed with the hospitalist Dr. Garcia and will be admitted and seen by the cardiologist in the morning    Patient is stable and pain-free at this time    Problems Addressed:  Palpitations: complicated acute illness or injury  Typical atrial flutter: complicated acute illness or injury    Amount and/or Complexity of Data Reviewed  External Data Reviewed: labs, ECG and notes.  Labs: ordered. Decision-making details documented in ED Course.  Radiology: ordered and independent interpretation performed. Decision-making details documented in ED Course.  ECG/medicine tests: ordered and independent interpretation performed. Decision-making details documented in ED Course.    Risk  OTC drugs.  Prescription drug management.  Decision regarding hospitalization.        Final diagnoses:   Palpitations   Typical atrial flutter       ED Disposition  ED Disposition       ED Disposition   Decision to Admit    Condition   --    Comment   Level of Care: Telemetry [5]   Admitting Physician: DIONTE GARCIA [[id number]]   Attending Physician: DIONTE GARCIA [[id number]]   Bed Request Comments: PCU bed                 No follow-up provider specified.       Medication List        ASK your doctor about these  "medications      atorvastatin 40 MG tablet  Commonly known as: LIPITOR  Ask about: Which instructions should I use?                 Ila Gabriel APRN  23      Electronically signed by Ila Gabriel APRN at 23       Operative/Procedure Notes (last 24 hours)  Notes from 23 1430 through 23 1430   No notes of this type exist for this encounter.          Physician Progress Notes (last 24 hours)        Viki Gonzalez PA-C at 23 1136              [facility] Medicine Services   Daily Progress Note    Patient Name: Nav Choudhary  : 1965  MRN: [id number]  Primary Care Physician:  Provider, No Known  Date of admission: 2023  Date and Time of Service: 23 at 1215      Subjective      Chief Complaint: Palpitations and some shortness of breath     Patient seen and examined today.  Patient states he continues to feel like his \"heart is racing\". He also notes some shortness of breath on exertion but he states he is \"used to it\". He denies any shortness of breath at rest, dizziness and denies any chest pain. Patient denies any other complaints at this time.     ROS 12 point ROS reviewed and negative except as mentioned above.      Objective      Vitals:   Temp:  [98.3 °F (36.8 °C)-98.4 °F (36.9 °C)] 98.4 °F (36.9 °C)  Heart Rate:  [125-149] 127  Resp:  [16-18] 18  BP: (113-149)/() 136/105    Physical Exam  Vitals and nursing note reviewed.   HENT:      Head: Normocephalic.   Eyes:      Extraocular Movements: Extraocular movements intact.      Pupils: Pupils are equal, round, and reactive to light.   Cardiovascular:      Rate and Rhythm: Regular rhythm. Tachycardia present.      Pulses: Normal pulses.   Pulmonary:      Effort: Pulmonary effort is normal.      Breath sounds: Normal breath sounds.   Abdominal:      General: Bowel sounds are normal.      Palpations: Abdomen is soft.   Musculoskeletal:         General: Normal range of motion. "   Skin:     General: Skin is warm and dry.   Neurological:      Mental Status: He is alert and oriented to person, place, and time.   Psychiatric:         Mood and Affect: Mood normal.         Result Review    Result Review:  I have personally reviewed the results from the time of this admission to 9/14/2023 11:36 EDT and agree with these findings:  [x]  Laboratory  [x]  Microbiology  [x]  Radiology  [x]  EKG/Telemetry   []  Cardiology/Vascular   []  Pathology  []  Old records  []  Other:  Most notable findings include:   CBC:      Lab 09/14/23  0415 09/13/23  1510   WBC 7.30 7.50   HEMOGLOBIN 14.3 14.7   HEMATOCRIT 42.7 43.9   PLATELETS 190 219   NEUTROS ABS 4.80 5.10   LYMPHS ABS 1.60 1.40   MONOS ABS 0.70 0.70   EOS ABS 0.20 0.10   MCV 90.9 89.9       CMP:        Lab 09/14/23  0755 09/14/23  0415 09/13/23  1510   SODIUM  --  138 140   POTASSIUM  --  4.5 4.0   CHLORIDE  --  105 107   CO2  --  25.0 22.0   ANION GAP  --  8.0 11.0   BUN  --  14 17   CREATININE  --  0.94 1.09   EGFR  --  95.1 79.7   GLUCOSE  --  107* 101*   CALCIUM  --  8.7 8.9   MAGNESIUM 2.1  --   --    TOTAL PROTEIN  --   --  6.1   ALBUMIN  --   --  4.0   GLOBULIN  --   --  2.1   ALT (SGPT)  --   --  22   AST (SGOT)  --   --  17   BILIRUBIN  --   --  0.4   ALK PHOS  --   --  81         Assessment & Plan      Brief Patient Summary:  Nav Choudhary is a 58 y.o. male with past medical history of atrial flutter off and on for 2 years and hypertension comes in with palpitation and shortness of breath secondary to atrial flutter with 2-1 conduction.        apixaban, 5 mg, Oral, BID  atorvastatin, 40 mg, Oral, Daily  dilTIAZem CD, 240 mg, Oral, Daily  diphenhydrAMINE, 50 mg, Oral, Once When Specified   Or  diphenhydrAMINE, 50 mg, Intravenous, Once When Specified   Or  diphenhydrAMINE, 50 mg, Intramuscular, Once When Specified  metoprolol succinate XL, 50 mg, Oral, Q12H  predniSONE, 50 mg, Oral, Once   Followed by  predniSONE, 50 mg, Oral,  Once  senna-docusate sodium, 2 tablet, Oral, BID  sodium chloride, 10 mL, Intravenous, Q12H       amiodarone, 0.5 mg/min, Last Rate: 0.5 mg/min (09/14/23 1043)         Active Hospital Problems:  Active Hospital Problems    Diagnosis     **Atrial flutter     Atrial fibrillation with RVR     Essential hypertension      Plan:      Atrial flutter with 2-1 conduction:  -Patient denies chest pain  -Put in observation at PCU.  -Continue amiodarone drip.  -Cardiology consult with Dr. Jade-  continue IV amiodarone, continue metoprolol and cardizem, per cardiology note  -Cardiac EP consult Dr. Mcdowell- plan for ablation, per his note.   -TSH 2.31  -Magnesium 2.1  -N.p.o. for possible ablation today  -We will continue home medications.     Hypertension:   Diastolic hypertension  -Blood pressure 136/105  -Continue home metoprolol and Cardizem  -Will give one dose of Hydrochlorothiazide 25mg to see if this improves his diastolic blood pressure   -Continue to monitor     DVT prophylaxis:  Medical DVT prophylaxis orders are present.    CODE STATUS:    Level Of Support Discussed With: Patient  Code Status (Patient has no pulse and is not breathing): CPR (Attempt to Resuscitate)  Medical Interventions (Patient has pulse or is breathing): Full Support      Disposition:  I expect patient to be discharged in 1-2 days.    This patient has been examined wearing appropriate Personal Protective Equipment and discussed with  attending physician Dr. Garcia . 09/14/23      Electronically signed by Viki Gonzalez PA-C, 09/14/23, 11:36 EDT.  [facility] Hospitalist Team             Electronically signed by Viki Gonzalez PA-C at 09/14/23 1419       Greg Jade MD at 09/14/23 0630          Referring Provider: Rhonda Garcia MD    Reason for follow-up:  Atrial flutter with RVR     Patient Care Team:  Provider, No Known as PCP - General    Subjective .      ROS    Since I have last seen, the patient has been without any chest  discomfort ,shortness of breath, palpitations, dizziness or syncope.  Denies having any headache ,abdominal pain ,nausea, vomiting , diarrhea constipation, loss of weight or loss of appetite.  Denies having any excessive bruising ,hematuria or blood in the stool.    Review of all systems negative except as indicated    History  Past Medical History:   Diagnosis Date    Abnormal ECG 6/27/23    Arrhythmia 3/13/2021    Atrial fibrillation     HLD (hyperlipidemia)     Hypertension        Past Surgical History:   Procedure Laterality Date    CARDIAC CATHETERIZATION N/A 6/28/2023    Procedure: Left Heart Cath and coronary angiogram;  Surgeon: Greg Jade MD;  Location: [facility] CATH INVASIVE LOCATION;  Service: Cardiovascular;  Laterality: N/A;    KNEE ARTHROPLASTY         Family History   Problem Relation Age of Onset    Heart disease Mother     Heart disease Father     Heart attack Father        Social History     Tobacco Use    Smoking status: Never     Passive exposure: Past (AS A CHILD)    Smokeless tobacco: Never   Vaping Use    Vaping Use: Never used   Substance Use Topics    Alcohol use: Yes     Alcohol/week: 12.0 standard drinks     Types: 12 Cans of beer per week     Comment: Beer on occasion    Drug use: Never        (Not in a hospital admission)      Allergies  Iodine    Scheduled Meds:apixaban, 5 mg, Oral, BID  atorvastatin, 40 mg, Oral, Daily  dilTIAZem CD, 240 mg, Oral, Daily  metoprolol succinate XL, 50 mg, Oral, Q12H  senna-docusate sodium, 2 tablet, Oral, BID  sodium chloride, 10 mL, Intravenous, Q12H      Continuous Infusions:amiodarone, 0.5 mg/min, Last Rate: 0.5 mg/min (09/14/23 0020)      PRN Meds:.  acetaminophen    senna-docusate sodium **AND** polyethylene glycol **AND** bisacodyl **AND** bisacodyl    nitroglycerin    ondansetron    sodium chloride    sodium chloride    sodium chloride    Objective     VITAL SIGNS  Vitals:    09/14/23 0430 09/14/23 0500 09/14/23 0530 09/14/23 0600   BP: (!)  "137/101 (!) 138/109 138/99 130/95   BP Location: Left arm Left arm Left arm Left arm   Patient Position: Lying Lying Lying Lying   Pulse: (!) 127 (!) 127 (!) 125 (!) 126   Resp:       Temp:       TempSrc:       SpO2: 94% 92% 93% 93%   Weight:       Height:           Flowsheet Rows      Flowsheet Row First Filed Value   Admission Height 190.5 cm (75\") Documented at 09/13/2023 1438   Admission Weight 122 kg (268 lb 15.4 oz) Documented at 09/13/2023 1438            No intake or output data in the 24 hours ending 09/14/23 0630     TELEMETRY: Atrial flutter with RVR    Physical Exam:  The patient is alert, oriented and in no distress.  Vital signs as noted above.  Head and neck revealed no carotid bruits or jugular venous distention.  No thyromegaly or lymphadenopathy is present  Lungs clear.  No wheezing.  Breath sounds are normal bilaterally.  Heart normal first and second heart sounds.  No murmur. No precordial rub is present.  No gallop is present.  Abdomen soft and nontender.  No organomegaly is present.  Extremities with good peripheral pulses without any pedal edema.  Skin warm and dry.  Musculoskeletal system is grossly normal  CNS grossly normal    Reviewed and updated.    Results Review:   I reviewed the patient's new clinical results.  Lab Results (last 24 hours)       Procedure Component Value Units Date/Time    CBC Auto Differential [[id number]]  (Normal) Collected: 09/14/23 0415    Specimen: Blood Updated: 09/14/23 0532     WBC 7.30 10*3/mm3      RBC 4.69 10*6/mm3      Hemoglobin 14.3 g/dL      Hematocrit 42.7 %      MCV 90.9 fL      MCH 30.4 pg      MCHC 33.5 g/dL      RDW 14.8 %      RDW-SD 46.4 fl      MPV 8.4 fL      Platelets 190 10*3/mm3      Neutrophil % 66.1 %      Lymphocyte % 21.4 %      Monocyte % 9.7 %      Eosinophil % 2.3 %      Basophil % 0.5 %      Neutrophils, Absolute 4.80 10*3/mm3      Lymphocytes, Absolute 1.60 10*3/mm3      Monocytes, Absolute 0.70 10*3/mm3      Eosinophils, Absolute " 0.20 10*3/mm3      Basophils, Absolute 0.00 10*3/mm3      nRBC 0.1 /100 WBC     Basic Metabolic Panel [[id number]]  (Abnormal) Collected: 09/14/23 0415    Specimen: Blood Updated: 09/14/23 0531     Glucose 107 mg/dL      BUN 14 mg/dL      Creatinine 0.94 mg/dL      Sodium 138 mmol/L      Potassium 4.5 mmol/L      Comment: Specimen hemolyzed.  Results may be affected.        Chloride 105 mmol/L      CO2 25.0 mmol/L      Calcium 8.7 mg/dL      BUN/Creatinine Ratio 14.9     Anion Gap 8.0 mmol/L      eGFR 95.1 mL/min/1.73     Narrative:      GFR Normal >60  Chronic Kidney Disease <60  Kidney Failure <15      Extra Tubes [[id number]] Collected: 09/13/23 1510    Specimen: Blood, Venous Line Updated: 09/13/23 1615    Narrative:      The following orders were created for panel order Extra Tubes.  Procedure                               Abnormality         Status                     ---------                               -----------         ------                     Gold Top - SST[[id number]]                                   Final result               Light Blue Top[[id number]]                                   Final result                 Please view results for these tests on the individual orders.    Gold Top - SST [[id number]] Collected: 09/13/23 1510    Specimen: Blood Updated: 09/13/23 1615     Extra Tube Hold for add-ons.     Comment: Auto resulted.       Light Blue Top [[id number]] Collected: 09/13/23 1510    Specimen: Blood Updated: 09/13/23 1615     Extra Tube Hold for add-ons.     Comment: Auto resulted       Comprehensive Metabolic Panel [[id number]]  (Abnormal) Collected: 09/13/23 1510    Specimen: Blood Updated: 09/13/23 1543     Glucose 101 mg/dL      BUN 17 mg/dL      Creatinine 1.09 mg/dL      Sodium 140 mmol/L      Potassium 4.0 mmol/L      Chloride 107 mmol/L      CO2 22.0 mmol/L      Calcium 8.9 mg/dL      Total Protein 6.1 g/dL      Albumin 4.0 g/dL      ALT (SGPT) 22 U/L      AST (SGOT) 17 U/L      Alkaline  Phosphatase 81 U/L      Total Bilirubin 0.4 mg/dL      Globulin 2.1 gm/dL      A/G Ratio 1.9 g/dL      BUN/Creatinine Ratio 15.6     Anion Gap 11.0 mmol/L      eGFR 79.7 mL/min/1.73     Narrative:      GFR Normal >60  Chronic Kidney Disease <60  Kidney Failure <15      Single High Sensitivity Troponin T [[id number]]  (Normal) Collected: 09/13/23 1510    Specimen: Blood Updated: 09/13/23 1543     HS Troponin T 8 ng/L     Narrative:      High Sensitive Troponin T Reference Range:  <10.0 ng/L- Negative Female for AMI  <15.0 ng/L- Negative Male for AMI  >=10 - Abnormal Female indicating possible myocardial injury.  >=15 - Abnormal Male indicating possible myocardial injury.   Clinicians would have to utilize clinical acumen, EKG, Troponin, and serial changes to determine if it is an Acute Myocardial Infarction or myocardial injury due to an underlying chronic condition.         BNP [[id number]]  (Abnormal) Collected: 09/13/23 1510    Specimen: Blood Updated: 09/13/23 1543     proBNP 1,230.0 pg/mL     Narrative:      Among patients with dyspnea, NT-proBNP is highly sensitive for the detection of acute congestive heart failure. In addition NT-proBNP of <300 pg/ml effectively rules out acute congestive heart failure with 99% negative predictive value.      CBC & Differential [[id number]]  (Abnormal) Collected: 09/13/23 1510    Specimen: Blood Updated: 09/13/23 1521    Narrative:      The following orders were created for panel order CBC & Differential.  Procedure                               Abnormality         Status                     ---------                               -----------         ------                     CBC Auto Differential[[id number]]        Abnormal            Final result                 Please view results for these tests on the individual orders.    CBC Auto Differential [[id number]]  (Abnormal) Collected: 09/13/23 1510    Specimen: Blood Updated: 09/13/23 1521     WBC 7.50 10*3/mm3      RBC 4.88  10*6/mm3      Hemoglobin 14.7 g/dL      Hematocrit 43.9 %      MCV 89.9 fL      MCH 30.2 pg      MCHC 33.6 g/dL      RDW 14.8 %      RDW-SD 45.9 fl      MPV 8.2 fL      Platelets 219 10*3/mm3      Neutrophil % 68.3 %      Lymphocyte % 18.9 %      Monocyte % 9.8 %      Eosinophil % 2.0 %      Basophil % 1.0 %      Neutrophils, Absolute 5.10 10*3/mm3      Lymphocytes, Absolute 1.40 10*3/mm3      Monocytes, Absolute 0.70 10*3/mm3      Eosinophils, Absolute 0.10 10*3/mm3      Basophils, Absolute 0.10 10*3/mm3      nRBC 0.1 /100 WBC             Imaging Results (Last 24 Hours)       Procedure Component Value Units Date/Time    XR Chest 1 View [[id number]] Collected: 09/13/23 1537     Updated: 09/13/23 1540    Narrative:      XR CHEST 1 VW    Date of Exam: 9/13/2023 3:24 PM EDT    Indication: CHF/COPD Protocol CHF/COPD Protocol    Comparison: None available.    Findings:  Cardiomediastinal silhouette is within normal limits, considering portable technique. No focal consolidation or overt pulmonary edema. No pleural effusion or pneumothorax. Osseous structures are unremarkable.      Impression:      Impression:  No evidence of acute cardiopulmonary disease.    Electronically Signed: Flip Springer MD    9/13/2023 3:38 PM EDT    Workstation ID: [id number]        LAB RESULTS (LAST 7 DAYS)    CBC  Results from last 7 days   Lab Units 09/14/23  0415 09/13/23  1510   WBC 10*3/mm3 7.30 7.50   RBC 10*6/mm3 4.69 4.88   HEMOGLOBIN g/dL 14.3 14.7   HEMATOCRIT % 42.7 43.9   MCV fL 90.9 89.9   PLATELETS 10*3/mm3 190 219       BMP  Results from last 7 days   Lab Units 09/14/23  0415 09/13/23  1510   SODIUM mmol/L 138 140   POTASSIUM mmol/L 4.5 4.0   CHLORIDE mmol/L 105 107   CO2 mmol/L 25.0 22.0   BUN mg/dL 14 17   CREATININE mg/dL 0.94 1.09   GLUCOSE mg/dL 107* 101*       CMP   Results from last 7 days   Lab Units 09/14/23  0415 09/13/23  1510   SODIUM mmol/L 138 140   POTASSIUM mmol/L 4.5 4.0   CHLORIDE mmol/L 105 107   CO2 mmol/L 25.0  22.0   BUN mg/dL 14 17   CREATININE mg/dL 0.94 1.09   GLUCOSE mg/dL 107* 101*   ALBUMIN g/dL  --  4.0   BILIRUBIN mg/dL  --  0.4   ALK PHOS U/L  --  81   AST (SGOT) U/L  --  17   ALT (SGPT) U/L  --  22         BNP        TROPONIN  Results from last 7 days   Lab Units 09/13/23  1510   HSTROP T ng/L 8       CoAg        Creatinine Clearance  Estimated Creatinine Clearance: 123.5 mL/min (by C-G formula based on SCr of 0.94 mg/dL).    ABG        Radiology  XR Chest 1 View    Result Date: 9/13/2023  Impression: No evidence of acute cardiopulmonary disease. Electronically Signed: Flip Springer MD  9/13/2023 3:38 PM EDT  Workstation ID: [id number]         EKG      I personally viewed and interpreted the patient's EKG/Telemetry data:    ECHOCARDIOGRAM:    Results for orders placed during the hospital encounter of 06/26/23    Adult Transthoracic Echo Complete W/ Cont if Necessary Per Protocol    Interpretation Summary    Left ventricular ejection fraction appears to be 56 - 60%.    Indications  Arrhythmia    Technically satisfactory study.  Mitral valve is structurally normal.  Tricuspid valve is structurally normal.  Aortic valve is structurally normal.  Pulmonic valve could not be well visualized.  No evidence for mitral tricuspid or aortic regurgitation is seen by Doppler study.  Left atrium is normal in size.  Right atrium is normal in size.  Left ventricle is normal in size and proximal inferior and posterior wall hypokinesis.  Right ventricle is normal in size.  Atrial septum is intact.  Aorta is normal.  No pericardial effusion or intracardiac thrombus is seen.    Impression  Structurally and functionally normal cardiac valves.  Left ventricular size and proximal posterior and inferior wall hypokinesis with ejection fraction of 60 %.          STRESS TEST  Results for orders placed during the hospital encounter of 06/26/23    Stress Test With Myocardial Perfusion One Day    Interpretation  Summary  Indications  Palpitations    This study was performed under the direct supervision of Hanna GARCIA.    Resting ECG  Sinus rhythm    The patient was injected with Lexiscan intravenously while constantly monitoring electrocardiogram and vital signs.  Patient did not have any chest discomfort ST abnormalities or ectopy with injection of Lexiscan.    Cardiolite was used as an imaging agent.    Cardiolite images showed decreased radionuclide uptake in the posterior lateral segments with partial reperfusion.    Gated SPECT images revealed normal left ventricle size and contractility with ejection fraction of 56%.    Impression  ========  Lexiscan Cardiolite test is abnormal with posterolateral infarction and ischemia.    Gated SPECT images revealed normal left ventricular size and contractility with ejection fraction of 56%.        Cardiolite (Tc-99m sestamibi) stress test    CARDIAC CATHETERIZATION  Results for orders placed during the hospital encounter of 06/26/23    Cardiac Catheterization/Vascular Study    Narrative  CARDIAC CATHETERIZATION REPORT    DATE OF PROCEDURE:  6/28/2023    INDICATION FOR PROCEDURE:  Shortness of breath  A-fib with RVR  Abnormal stress Cardiolite test    PROCEDURE PERFORMED:  Left heart catheterization, coronary angiography, left ventriculography    @@  Moderate conscious sedation was utilized with intravenous Versed and fentanyl administered by registered nurse with continuous ECG, pulse oximetry and hemodynamic monitoring supervised by myself throughout the entire procedure.  Conscious sedation time   30 minutes    I was present with the patient for the duration of moderate sedation and supervised staff who had no other duties and monitored the patient for the entire procedure.    @@  PROCEDURE COMMENTS:    Under usual sterile precautions and 1% Xylocaine infiltration right femoral artery was percutaneously punctured and a 5 Syriac vascular sheath was introduced into the right  femoral artery.  Left and right coronary arteriography was performed in varying degrees of obliquity followed by left ventricular angiogram.  Patient has tortuosity of the right common iliac artery and all catheters were exchanged using exchange guidewire.  Hemostasis was obtained and patient was returned to the room with intact pulses.  No complications were noted.    FINDINGS:    1. HEMODYNAMICS:  Left ventricle end-diastolic pressure is normal.  No gradient was noted across the aortic valve.    2. LEFT VENTRICULOGRAPHY:  Left ventricular size and contractility is normal with ejection fraction of 60%.  No mitral regurgitation is present.    3. CORONARY ANGIOGRAPHY:  Left main coronary artery is normal.  Left anterior descending artery is normal.  Circumflex coronary artery is normal.  Right coronary artery is a large and dominant vessel and is normal.    SUMMARY:  Normal left ventricular size and contractility with ejection fraction of 60%.  Normal epicardial coronary arteries.    RECOMMENDATIONS:  Noncardiac work-up.  Continue aspirin and beta-blocker for atrial fibrillation.  Anticoagulation versus 30-day event monitor to assess A-fib burden and consider anticoagulation.                OTHER:         Assessment & Plan     Principal Problem:    Atrial flutter      ]]]]]]]]]]]]]]]]]]]]  History  =============  - Patient has atrial flutter with RVR.    - History of atrial fibrillation with RVR.  Patient has increasing frequency of palpitations.  Patient had work-up in [place] approximately 2 years ago.  Patient was maintaining sinus rhythm.  Patient is now having paroxysmal atrial fibrillation     - Shortness of breath     - Mild elevation of troponin at 180-flat (high-sensitivity troponin)  EKG showed no acute changes     Echocardiogram-6/27/2023 showed proximal posterior and inferior hypokinesis.  Lexiscan Cardiolite test is abnormal with proximal posterior wall ischemia.  6/27/2023     Cardiac cath 6/28/2023  revealed normal left ventricle function and normal coronary arteries.     - Hypertension dyslipidemia     - Exogenous obesity     Status post bilateral knee replacements     - Non-smoker     - Occasional beer drinking     - Allergic to iodine  =============  Plan  ==============  Atrial flutter with RVR.  Patient was not responding to medications as outpatient.  Patient is on metoprolol and p.o. Cardizem.  Patient did not respond to intravenous Cardizem.  Patient was started on intravenous amiodarone.  Patient to have consideration for a flutter ablation.    Past history of history of A-fib with RVR.  Patient has converted to sinus rhythm.  Past history of A-fib ablation 2 years ago in [place].     Hypertension-124/87     Dyslipidemia-continue atorvastatin    Anticoagulation-patient is on Eliquis.    Check TSH and magnesium level.    Continue intravenous amiodarone.  Have discussed with Dr. Mcdowell regarding the ablation for atrial flutter.  Continue metoprolol and p.o. Cardizem.  Patient is scheduled for atrial flutter ablation today.    Risks and benefits pros and cons of the procedure for atrial flutter was discussed.    Further plan will depend on patient's progress.    Reviewed and updated-2023  ]]]]]]]]]]]]]]]]]]]]]]              Greg Jade MD  23  06:30 EDT                Electronically signed by Greg Jade MD at 23 0925          Consult Notes (last 24 hours)        Andrade Mcdowell MD at 23 0826           HP      Name: Nav Choudhary ADMIT: 2023   : 1965  PCP: Provider, No Known    MRN: [id number] LOS: 0 days   AGE/SEX: 58 y.o. male  ROOM: 32/32     Chief Complaint   Patient presents with    Irregular Heart Beat       Subjective        History of present illness  Nav Choudhary is a 58-year-old male patient who has no history of coronary artery disease, is admitted to the hospital for palpitations and tachycardia.  He was first diagnosed with atrial  arrhythmias in July 2023.    Past Medical History:   Diagnosis Date    Abnormal ECG 6/27/23    Arrhythmia 3/13/2021    Atrial fibrillation     HLD (hyperlipidemia)     Hypertension      Past Surgical History:   Procedure Laterality Date    CARDIAC CATHETERIZATION N/A 6/28/2023    Procedure: Left Heart Cath and coronary angiogram;  Surgeon: Greg Jade MD;  Location: [facility] CATH INVASIVE LOCATION;  Service: Cardiovascular;  Laterality: N/A;    KNEE ARTHROPLASTY       Family History   Problem Relation Age of Onset    Heart disease Mother     Heart disease Father     Heart attack Father      Social History     Tobacco Use    Smoking status: Never     Passive exposure: Past (AS A CHILD)    Smokeless tobacco: Never   Vaping Use    Vaping Use: Never used   Substance Use Topics    Alcohol use: Yes     Alcohol/week: 12.0 standard drinks     Types: 12 Cans of beer per week     Comment: Beer on occasion    Drug use: Never     (Not in a hospital admission)    Allergies:  Iodine    Review of systems    Constitutional: Negative.    Respiratory and cardiovascular: As detailed in HPI section.  Gastrointestinal: Negative for constipation, nausea and vomiting negative for abdominal distention, abdominal pain and diarrhea.   Genitourinary: Negative for difficulty urinating and flank pain.   Musculoskeletal: Negative for arthralgias, joint swelling and myalgias.   Skin: Negative for color change, rash and wound.   Neurological: Negative for dizziness, syncope, weakness and headaches.   Hematological: Negative for adenopathy.   Psychiatric/Behavioral: Negative for confusion.   All other systems reviewed and are negative.       Physical Exam  VITALS REVIEWED    General:      well developed, in no acute distress.    Head:      normocephalic and atraumatic.    Eyes:      PERRL/EOM intact, conjunctiva and sclera clear with out nystagmus.    Neck:      no masses, thyromegaly,  trachea central with normal respiratory effort and PMI  displaced laterally  Lungs:      Clear to auscultation bilaterally  Heart:       Irregular rhythm, tachycardic  Msk:      no deformity or scoliosis noted of thoracic or lumbar spine.    Pulses:      pulses normal in all 4 extremities.    Extremities:       No lower extremity edema  Neurologic:      no focal deficits.   alert oriented x3  Skin:      intact without lesions or rashes.    Psych:      alert and cooperative; normal mood and affect; normal attention span and concentration.      Result Review :               Pertinent cardiac workup    EKG 9/13/2023 atrial flutter ventricular rate of 146 bpm.  Event monitor 6/28/2023 for 25 days shows both atrial fibrillation and atrial flutter.  Heart cath 7/10/2023 normal coronaries  Echo 6/27/2023 ejection fraction 55 to 60%.        Assessment and Plan         Atrial flutter    Atrial fibrillation with RVR    Essential hypertension      Nav Choudhary is a 58-year-old male patient who was diagnosed with atrial flutter and atrial fibrillation few months back, is admitted to the hospital due to tachycardia.  It was paroxysmal at first on his event monitor in June 2023, however patient developed palpitations couple of days back and symptoms did not subside.  Who presented to the office and was found to be in atrial flutter at a rate of 150 bpm.  Patient was advised to come to the ER.  Here he remains in atrial flutter.  He is currently receiving IV amiodarone.  At home he had been on Toprol-XL, Cardizem and Eliquis.  Plan for ablation.      No follow-ups on file.  Patient was given instructions and counseling regarding his condition or for health maintenance advice. Please see specific information pulled into the AVS if appropriate.      Electronically signed by Andrade Mcdowell MD at 09/14/23 4190       Greg Jade MD at 09/13/23 3603            Referring Provider: Daniele Tamayo DO  Reason for Consultation:  Persistent atrial flutter    Patient Care  Team:  Provider, No Known as PCP - General    Chief complaint  Abnormal heart rhythm    Subjective .     History of present illness:  Nav Choudhary is a 58 y.o. male who presents with history of abnormal heart rhythm.  Patient has history of atrial fibrillation.  Patient had ablation in the past.  Patient recently has been having recurrent and persistent atrial flutter.  Patient has been on Cardizem and metoprolol as well as anticoagulation with Eliquis.  Patient was seen in the office and patient was advised admission to the hospital since patient had atrial flutter with rate of 145/min.  Patient wanted to wait and return to the ER today.  Patient initially was started on intravenous Cardizem and patient did not respond to decreased rate.  Patient was started on intravenous amiodarone.    ROS      The patient has been without any chest discomfort, shortness of breath, palpitations, dizziness or syncope.  Denies having any headache, abdominal pain, nausea, vomiting, diarrhea, constipation, loss of weight or loss of appetite.  Denies having any excessive bruising, hematuria or blood in the stool.    Review of all systems negative except as indicated      History  Past Medical History:   Diagnosis Date    Abnormal ECG 6/27/23    Arrhythmia 3/13/2021    Atrial fibrillation     HLD (hyperlipidemia)     Hypertension        Past Surgical History:   Procedure Laterality Date    CARDIAC CATHETERIZATION N/A 6/28/2023    Procedure: Left Heart Cath and coronary angiogram;  Surgeon: Greg Jade MD;  Location: [facility] CATH INVASIVE LOCATION;  Service: Cardiovascular;  Laterality: N/A;    KNEE ARTHROPLASTY         Family History   Problem Relation Age of Onset    Heart disease Mother     Heart disease Father     Heart attack Father        Social History     Tobacco Use    Smoking status: Never     Passive exposure: Past (AS A CHILD)    Smokeless tobacco: Never   Vaping Use    Vaping Use: Never used   Substance Use Topics  "   Alcohol use: Yes     Alcohol/week: 12.0 standard drinks     Types: 12 Cans of beer per week     Comment: Beer on occasion    Drug use: Never        (Not in a hospital admission)        Iodine    Scheduled Meds:   Continuous Infusions:amiodarone, 1 mg/min, Last Rate: 1 mg/min (09/13/23 1804)   Followed by  [START ON 9/14/2023] amiodarone, 0.5 mg/min      PRN Meds:.  sodium chloride    Objective     VITAL SIGNS  Vitals:    09/13/23 1438 09/13/23 1629 09/13/23 1747 09/13/23 1811   BP:  126/93 128/92 129/93   Pulse:  (!) 146 (!) 144 (!) 148   Resp: 16 18     Temp: 98.3 °F (36.8 °C)      TempSrc: Oral      SpO2:  98%     Weight: 122 kg (268 lb 15.4 oz)      Height: 190.5 cm (75\")          Flowsheet Rows      Flowsheet Row First Filed Value   Admission Height 190.5 cm (75\") Documented at 09/13/2023 1438   Admission Weight 122 kg (268 lb 15.4 oz) Documented at 09/13/2023 1438            No intake or output data in the 24 hours ending 09/13/23 1825     TELEMETRY: Atrial flutter with rapid ventricular response.    Physical Exam:  The patient is alert, oriented and in no distress.  Vital signs as noted above.  Head and neck revealed no carotid bruits or jugular venous distention.  No thyromegaly or lymphadenopathy is present  Lungs clear.  No wheezing.  Breath sounds are normal bilaterally.  Heart normal first and second heart sounds. No murmur.  No precordial rub is present.  No gallop is present.  Abdomen soft and nontender.  No organomegaly is present.  Extremities with good peripheral pulses without any pedal edema.  Skin warm and dry.  Musculoskeletal system is grossly normal  CNS grossly normal    Reviewed and updated.    Results Review:   I reviewed the patient's new clinical results.  Lab Results (last 24 hours)       Procedure Component Value Units Date/Time    Extra Tubes [[id number]] Collected: 09/13/23 1510    Specimen: Blood, Venous Line Updated: 09/13/23 1615    Narrative:      The following orders were " created for panel order Extra Tubes.  Procedure                               Abnormality         Status                     ---------                               -----------         ------                     Gold Top - SST[[id number]]                                   Final result               Light Blue Top[[id number]]                                   Final result                 Please view results for these tests on the individual orders.    Gold Top - SST [[id number]] Collected: 09/13/23 1510    Specimen: Blood Updated: 09/13/23 1615     Extra Tube Hold for add-ons.     Comment: Auto resulted.       Light Blue Top [[id number]] Collected: 09/13/23 1510    Specimen: Blood Updated: 09/13/23 1615     Extra Tube Hold for add-ons.     Comment: Auto resulted       Comprehensive Metabolic Panel [[id number]]  (Abnormal) Collected: 09/13/23 1510    Specimen: Blood Updated: 09/13/23 1543     Glucose 101 mg/dL      BUN 17 mg/dL      Creatinine 1.09 mg/dL      Sodium 140 mmol/L      Potassium 4.0 mmol/L      Chloride 107 mmol/L      CO2 22.0 mmol/L      Calcium 8.9 mg/dL      Total Protein 6.1 g/dL      Albumin 4.0 g/dL      ALT (SGPT) 22 U/L      AST (SGOT) 17 U/L      Alkaline Phosphatase 81 U/L      Total Bilirubin 0.4 mg/dL      Globulin 2.1 gm/dL      A/G Ratio 1.9 g/dL      BUN/Creatinine Ratio 15.6     Anion Gap 11.0 mmol/L      eGFR 79.7 mL/min/1.73     Narrative:      GFR Normal >60  Chronic Kidney Disease <60  Kidney Failure <15      Single High Sensitivity Troponin T [[id number]]  (Normal) Collected: 09/13/23 1510    Specimen: Blood Updated: 09/13/23 1543     HS Troponin T 8 ng/L     Narrative:      High Sensitive Troponin T Reference Range:  <10.0 ng/L- Negative Female for AMI  <15.0 ng/L- Negative Male for AMI  >=10 - Abnormal Female indicating possible myocardial injury.  >=15 - Abnormal Male indicating possible myocardial injury.   Clinicians would have to utilize clinical acumen, EKG, Troponin, and serial  changes to determine if it is an Acute Myocardial Infarction or myocardial injury due to an underlying chronic condition.         BNP [[id number]]  (Abnormal) Collected: 09/13/23 1510    Specimen: Blood Updated: 09/13/23 1543     proBNP 1,230.0 pg/mL     Narrative:      Among patients with dyspnea, NT-proBNP is highly sensitive for the detection of acute congestive heart failure. In addition NT-proBNP of <300 pg/ml effectively rules out acute congestive heart failure with 99% negative predictive value.      CBC & Differential [[id number]]  (Abnormal) Collected: 09/13/23 1510    Specimen: Blood Updated: 09/13/23 1521    Narrative:      The following orders were created for panel order CBC & Differential.  Procedure                               Abnormality         Status                     ---------                               -----------         ------                     CBC Auto Differential[[id number]]        Abnormal            Final result                 Please view results for these tests on the individual orders.    CBC Auto Differential [[id number]]  (Abnormal) Collected: 09/13/23 1510    Specimen: Blood Updated: 09/13/23 1521     WBC 7.50 10*3/mm3      RBC 4.88 10*6/mm3      Hemoglobin 14.7 g/dL      Hematocrit 43.9 %      MCV 89.9 fL      MCH 30.2 pg      MCHC 33.6 g/dL      RDW 14.8 %      RDW-SD 45.9 fl      MPV 8.2 fL      Platelets 219 10*3/mm3      Neutrophil % 68.3 %      Lymphocyte % 18.9 %      Monocyte % 9.8 %      Eosinophil % 2.0 %      Basophil % 1.0 %      Neutrophils, Absolute 5.10 10*3/mm3      Lymphocytes, Absolute 1.40 10*3/mm3      Monocytes, Absolute 0.70 10*3/mm3      Eosinophils, Absolute 0.10 10*3/mm3      Basophils, Absolute 0.10 10*3/mm3      nRBC 0.1 /100 WBC             Imaging Results (Last 24 Hours)       Procedure Component Value Units Date/Time    XR Chest 1 View [[id number]] Collected: 09/13/23 1537     Updated: 09/13/23 1540    Narrative:      XR CHEST 1 VW    Date of Exam:  9/13/2023 3:24 PM EDT    Indication: CHF/COPD Protocol CHF/COPD Protocol    Comparison: None available.    Findings:  Cardiomediastinal silhouette is within normal limits, considering portable technique. No focal consolidation or overt pulmonary edema. No pleural effusion or pneumothorax. Osseous structures are unremarkable.      Impression:      Impression:  No evidence of acute cardiopulmonary disease.    Electronically Signed: Flip Springer MD    9/13/2023 3:38 PM EDT    Workstation ID: [id number]        LAB RESULTS (LAST 7 DAYS)    CBC  Results from last 7 days   Lab Units 09/13/23  1510   WBC 10*3/mm3 7.50   RBC 10*6/mm3 4.88   HEMOGLOBIN g/dL 14.7   HEMATOCRIT % 43.9   MCV fL 89.9   PLATELETS 10*3/mm3 219       BMP  Results from last 7 days   Lab Units 09/13/23  1510   SODIUM mmol/L 140   POTASSIUM mmol/L 4.0   CHLORIDE mmol/L 107   CO2 mmol/L 22.0   BUN mg/dL 17   CREATININE mg/dL 1.09   GLUCOSE mg/dL 101*       CMP   Results from last 7 days   Lab Units 09/13/23  1510   SODIUM mmol/L 140   POTASSIUM mmol/L 4.0   CHLORIDE mmol/L 107   CO2 mmol/L 22.0   BUN mg/dL 17   CREATININE mg/dL 1.09   GLUCOSE mg/dL 101*   ALBUMIN g/dL 4.0   BILIRUBIN mg/dL 0.4   ALK PHOS U/L 81   AST (SGOT) U/L 17   ALT (SGPT) U/L 22         BNP        TROPONIN  Results from last 7 days   Lab Units 09/13/23  1510   HSTROP T ng/L 8       CoAg        Creatinine Clearance  Estimated Creatinine Clearance: 106.5 mL/min (by C-G formula based on SCr of 1.09 mg/dL).    ABG        Radiology  XR Chest 1 View    Result Date: 9/13/2023  Impression: No evidence of acute cardiopulmonary disease. Electronically Signed: Flip Springer MD  9/13/2023 3:38 PM EDT  Workstation ID: [id number]       EKG      I personally viewed and interpreted the patient's EKG/Telemetry data: Atrial flutter with RVR.    ECHOCARDIOGRAM:    Results for orders placed during the hospital encounter of 06/26/23    Adult Transthoracic Echo Complete W/ Cont if Necessary Per  Protocol    Interpretation Summary    Left ventricular ejection fraction appears to be 56 - 60%.    Indications  Arrhythmia    Technically satisfactory study.  Mitral valve is structurally normal.  Tricuspid valve is structurally normal.  Aortic valve is structurally normal.  Pulmonic valve could not be well visualized.  No evidence for mitral tricuspid or aortic regurgitation is seen by Doppler study.  Left atrium is normal in size.  Right atrium is normal in size.  Left ventricle is normal in size and proximal inferior and posterior wall hypokinesis.  Right ventricle is normal in size.  Atrial septum is intact.  Aorta is normal.  No pericardial effusion or intracardiac thrombus is seen.    Impression  Structurally and functionally normal cardiac valves.  Left ventricular size and proximal posterior and inferior wall hypokinesis with ejection fraction of 60 %.      STRESS TEST  Results for orders placed during the hospital encounter of 06/26/23    Stress Test With Myocardial Perfusion One Day    Interpretation Summary  Indications  Palpitations    This study was performed under the direct supervision of Hanna GARCIA.    Resting ECG  Sinus rhythm    The patient was injected with Lexiscan intravenously while constantly monitoring electrocardiogram and vital signs.  Patient did not have any chest discomfort ST abnormalities or ectopy with injection of Lexiscan.    Cardiolite was used as an imaging agent.    Cardiolite images showed decreased radionuclide uptake in the posterior lateral segments with partial reperfusion.    Gated SPECT images revealed normal left ventricle size and contractility with ejection fraction of 56%.    Impression  ========  Lexiscan Cardiolite test is abnormal with posterolateral infarction and ischemia.    Gated SPECT images revealed normal left ventricular size and contractility with ejection fraction of 56%.        Cardiolite (Tc-99m sestamibi) stress test    HEART CATHETERIZATION  Results  for orders placed during the hospital encounter of 06/26/23    Cardiac Catheterization/Vascular Study    Narrative  CARDIAC CATHETERIZATION REPORT    DATE OF PROCEDURE:  6/28/2023    INDICATION FOR PROCEDURE:  Shortness of breath  A-fib with RVR  Abnormal stress Cardiolite test    PROCEDURE PERFORMED:  Left heart catheterization, coronary angiography, left ventriculography    @@  Moderate conscious sedation was utilized with intravenous Versed and fentanyl administered by registered nurse with continuous ECG, pulse oximetry and hemodynamic monitoring supervised by myself throughout the entire procedure.  Conscious sedation time   30 minutes    I was present with the patient for the duration of moderate sedation and supervised staff who had no other duties and monitored the patient for the entire procedure.    @@  PROCEDURE COMMENTS:    Under usual sterile precautions and 1% Xylocaine infiltration right femoral artery was percutaneously punctured and a 5 Hebrew vascular sheath was introduced into the right femoral artery.  Left and right coronary arteriography was performed in varying degrees of obliquity followed by left ventricular angiogram.  Patient has tortuosity of the right common iliac artery and all catheters were exchanged using exchange guidewire.  Hemostasis was obtained and patient was returned to the room with intact pulses.  No complications were noted.    FINDINGS:    1. HEMODYNAMICS:  Left ventricle end-diastolic pressure is normal.  No gradient was noted across the aortic valve.    2. LEFT VENTRICULOGRAPHY:  Left ventricular size and contractility is normal with ejection fraction of 60%.  No mitral regurgitation is present.    3. CORONARY ANGIOGRAPHY:  Left main coronary artery is normal.  Left anterior descending artery is normal.  Circumflex coronary artery is normal.  Right coronary artery is a large and dominant vessel and is normal.    SUMMARY:  Normal left ventricular size and contractility  with ejection fraction of 60%.  Normal epicardial coronary arteries.    RECOMMENDATIONS:  Noncardiac work-up.  Continue aspirin and beta-blocker for atrial fibrillation.  Anticoagulation versus 30-day event monitor to assess A-fib burden and consider anticoagulation.      OTHER:     Assessment & Plan     Active Problems:    * No active hospital problems. *      Assessment and Plan         ]]]]]]]]]]]]]]]]]]]]]  History  =============  - Patient has atrial flutter with RVR.    - History of atrial fibrillation with RVR.  Patient has increasing frequency of palpitations.  Patient had work-up in [place] approximately 2 years ago.  Patient was maintaining sinus rhythm.  Patient is now having paroxysmal atrial fibrillation     - Shortness of breath     - Mild elevation of troponin at 180-flat (high-sensitivity troponin)  EKG showed no acute changes     Echocardiogram-6/27/2023 showed proximal posterior and inferior hypokinesis.  Lexiscan Cardiolite test is abnormal with proximal posterior wall ischemia.  6/27/2023     Cardiac cath 6/28/2023 revealed normal left ventricle function and normal coronary arteries.     - Hypertension dyslipidemia     - Exogenous obesity     Status post bilateral knee replacements     - Non-smoker     - Occasional beer drinking     - Allergic to iodine  =============  Plan  ==============  Atrial flutter with RVR.  Patient was not responding to medications as outpatient.  Patient is on metoprolol and p.o. Cardizem.  Patient did not respond to intravenous Cardizem.  Patient was started on intravenous amiodarone.  Patient to have consideration for a flutter ablation.    Past history of history of A-fib with RVR.  Patient has converted to sinus rhythm.  Past history of A-fib ablation 2 years ago in [place].     Hypertension-124/87     Dyslipidemia-continue atorvastatin    Anticoagulation-patient is on Eliquis.    Continue intravenous amiodarone.  Have discussed with Dr. Mcdowell regarding  the ablation for atrial flutter.  Continue metoprolol and p.o. Cardizem.    Risks and benefits pros and cons of the procedure for atrial flutter was discussed.    Further plan will depend on patient's progress.    Reviewed and updated-9/13/2023  ]]]]]]]]]]]]]]]]]]]]]]             Greg Jade MD  09/13/23  18:25 EDT              Electronically signed by Greg Jade MD at 09/14/23 0919

## 2023-09-14 NOTE — NURSING NOTE
Notfied Dr Gautam pt HR in 130's. Pt still on Amio @ 1mg/min. Scheduled to decrease @ 0004. No new orders at this time. MD stated ok to titrate Amio GTT per orders. Pt getting ablation in am. Pt Aox4 denies pain/discomfort. No s/s of distress noted.

## 2023-09-14 NOTE — ANESTHESIA PROCEDURE NOTES
Airway  Urgency: elective    Date/Time: 9/14/2023 4:49 PM  End Time:9/14/2023 4:51 PM  Airway not difficult    General Information and Staff    Patient location during procedure: OR  CRNA/CAA: Jodie Tanner CRNA    Indications and Patient Condition  Indications for airway management: airway protection    Preoxygenated: yes  MILS maintained throughout  Mask difficulty assessment: 1 - vent by mask    Final Airway Details  Final airway type: endotracheal airway      Successful airway: ETT  Cuffed: yes   Successful intubation technique: video laryngoscopy  Facilitating devices/methods: intubating stylet  Endotracheal tube insertion site: oral  Blade: Tammy  Blade size: 4  ETT size (mm): 7.5  Cormack-Lehane Classification: grade I - full view of glottis  Placement verified by: chest auscultation and capnometry   Measured from: lips  ETT/EBT  to lips (cm): 22  Number of attempts at approach: 1  Assessment: lips, teeth, and gum same as pre-op and atraumatic intubation

## 2023-09-15 VITALS
DIASTOLIC BLOOD PRESSURE: 72 MMHG | TEMPERATURE: 98.2 F | RESPIRATION RATE: 22 BRPM | BODY MASS INDEX: 32.07 KG/M2 | WEIGHT: 257.94 LBS | SYSTOLIC BLOOD PRESSURE: 123 MMHG | HEIGHT: 75 IN | HEART RATE: 73 BPM | OXYGEN SATURATION: 96 %

## 2023-09-15 LAB
QT INTERVAL: 474 MS
QTC INTERVAL: 511 MS

## 2023-09-15 PROCEDURE — 93005 ELECTROCARDIOGRAM TRACING: CPT | Performed by: INTERNAL MEDICINE

## 2023-09-15 RX ORDER — FLECAINIDE ACETATE 100 MG/1
100 TABLET ORAL EVERY 12 HOURS SCHEDULED
Qty: 30 TABLET | Refills: 0 | Status: SHIPPED | OUTPATIENT
Start: 2023-09-15

## 2023-09-15 RX ADMIN — APIXABAN 5 MG: 5 TABLET, FILM COATED ORAL at 08:17

## 2023-09-15 RX ADMIN — ATORVASTATIN CALCIUM 40 MG: 40 TABLET, FILM COATED ORAL at 08:17

## 2023-09-15 RX ADMIN — METOPROLOL SUCCINATE 50 MG: 50 TABLET, EXTENDED RELEASE ORAL at 08:18

## 2023-09-15 RX ADMIN — FLECAINIDE ACETATE 100 MG: 50 TABLET ORAL at 08:17

## 2023-09-15 RX ADMIN — Medication 10 ML: at 08:18

## 2023-09-15 NOTE — PAYOR COMM NOTE
"Clinical update for case# [id number]     Observation order 9/13  Inpatient order 9/14/23    ER admit with A.flutter - patient required IV amiodarone drip. Cardiac ablation performed.   Updated clinical and MD notes attached.   ========  AUTHORIZATION PENDING:   PLEASE FAX OR CALL DETERMINATION TO CONTACT BELOW:       THANK YOU,    MAUREEN Guallpa, RN  Utilization Review  [facility]  Phone: [phone]  Fax: [phone]      NPI: [id number]  Tax ID: [id number]        Rojasricardo Les MARY KAY (58 y.o. Male)       Date of Birth   1965    Social Security Number       Address   [address] [place] [address]    Home Phone   [phone]    MRN   [id number]       Amish   None    Marital Status                               Admission Date   9/13/23    Admission Type   Emergency    Admitting Provider       Attending Provider   Farhat Jacob MD    Department, Room/Bed   [facility] PROGRESS CARE, 2118/1       Discharge Date       Discharge Disposition       Discharge Destination                                 Attending Provider: Farhat Jacob MD    Allergies: Iodine    Isolation: None   Infection: None   Code Status: CPR    Ht: 190.5 cm (75\")   Wt: 117 kg (257 lb 15 oz)    Admission Cmt: None   Principal Problem: Atrial flutter [I48.92]                   Active Insurance as of 9/13/2023       Primary Coverage       Payor Plan Insurance Group Employer/Plan Group    [facility] BLUE CROSS [facility] BLUE CROSS BLUE SHIELD PPO [id number]       Payor Plan Address Payor Plan Phone Number Payor Plan Fax Number Effective Dates    [address] [phone]  11/1/2021 - None Entered    [facility] [id number]         Subscriber Name Subscriber Birth Date Member ID       LES PIKE 1965 [id number]                     Emergency Contacts        (Rel.) Home Phone Work Phone Mobile Phone    NICHO PIKE (Spouse) -- -- [phone]              Vital Signs " (last day)       Date/Time Temp Temp src Pulse Resp BP Patient Position SpO2    09/15/23 0507 98 (36.7) Oral 77 15 118/80 Lying 93    09/15/23 0131 98 (36.7) Oral 81 17 112/65 Lying 93    09/14/23 2200 98 (36.7) Oral 83 17 122/80 Lying 95    09/14/23 1930 98 (36.7) Oral 68 14 126/85 Lying 95    09/14/23 1845 -- -- 62 12 110/74 -- 94    09/14/23 1840 98.2 (36.8) -- 63 12 113/75 -- 96    09/14/23 1835 -- -- 61 12 112/74 -- 97    09/14/23 1830 -- -- 61 12 114/69 -- 94    09/14/23 1825 -- -- 62 12 114/74 -- 94    09/14/23 1820 -- -- 61 12 115/78 -- 95    09/14/23 1814 -- -- 61 10 119/80 -- 98    09/14/23 1809 98 (36.7) Oral 62 10 116/83 -- 97    09/14/23 1619 97.9 (36.6) Oral 128 14 140/104 Lying 95    09/14/23 1400 -- -- 126 -- 138/108 -- 94 09/14/23 1330 -- -- 127 -- 139/103 -- 94 09/14/23 1300 -- -- 126 -- 142/105 -- 92 09/14/23 1230 -- -- 126 -- 136/102 -- 94 09/14/23 1200 -- -- 126 -- 146/107 -- 94    09/14/23 1130 -- -- 127 -- 139/104 -- 95 09/14/23 1100 -- -- 127 -- 141/108 -- 93    09/14/23 1030 -- -- 125 -- -- -- 96    09/14/23 1000 -- -- 127 -- 136/105 -- 95    09/14/23 0930 -- -- 127 -- 142/105 -- 95    09/14/23 0900 -- -- 127 -- 142/103 -- 94    09/14/23 0830 -- -- 127 -- 139/104 -- 94 09/14/23 0800 -- -- 126 -- 134/105 -- 96 09/14/23 0730 -- -- 126 -- -- -- 96    09/14/23 0700 -- -- 126 -- 118/94 -- 93    09/14/23 0630 -- -- 126 -- 121/85 -- 93 09/14/23 0600 -- -- 126 -- 130/95 Lying 93 09/14/23 0530 -- -- 125 -- 138/99 Lying 93 09/14/23 0500 -- -- 127 -- 138/109 Lying 92    09/14/23 0430 -- -- 127 -- 137/101 Lying 94    09/14/23 0400 -- -- 127 18 119/79 Lying 92    09/14/23 0330 -- -- 130 -- 120/89 Lying 94    09/14/23 0300 -- -- 128 -- 113/88 Sitting 97    09/14/23 0230 -- -- 129 -- 121/87 Sitting 91    09/14/23 0200 -- -- 130 -- 113/88 Sitting 90    09/14/23 0130 -- -- 131 -- 127/94 Sitting 93    09/14/23 0100 -- -- 132 -- 123/98 Sitting 93    09/14/23 0030 -- -- 133 --  124/99 Sitting 94    09/14/23 0015 -- -- 133 -- -- -- 94    09/14/23 0000 98.4 (36.9) Oral 133 -- 124/94 Sitting 94          Oxygen Therapy (last day)       Date/Time SpO2 Device (Oxygen Therapy) Flow (L/min) Oxygen Concentration (%) ETCO2 (mmHg)    09/15/23 0507 93 room air -- -- --    09/15/23 0400 -- room air -- -- --    09/15/23 0131 93 room air -- -- --    09/15/23 0000 -- room air -- -- --    09/14/23 2200 95 room air -- -- --    09/14/23 1930 95 room air -- -- --    09/14/23 1905 -- room air -- -- --    09/14/23 1845 94 -- -- -- --    09/14/23 1840 96 -- -- -- --    09/14/23 1835 97 -- -- -- --    09/14/23 1830 94 -- -- -- --    09/14/23 1825 94 -- -- -- --    09/14/23 1820 95 -- -- -- --    09/14/23 1814 98 -- -- -- --    09/14/23 1809 97 room air -- -- --    09/14/23 1619 95 -- -- -- --    09/14/23 1618 -- room air -- -- --    09/14/23 1400 94 -- -- -- --    09/14/23 1330 94 -- -- -- --    09/14/23 1300 92 -- -- -- --    09/14/23 1230 94 -- -- -- --    09/14/23 1200 94 -- -- -- --    09/14/23 1130 95 -- -- -- --    09/14/23 1100 93 -- -- -- --    09/14/23 1030 96 -- -- -- --    09/14/23 1000 95 -- -- -- --    09/14/23 0930 95 -- -- -- --    09/14/23 0900 94 -- -- -- --    09/14/23 0830 94 room air -- -- --    09/14/23 0800 96 -- -- -- --    09/14/23 0730 96 -- -- -- --    09/14/23 0700 93 -- -- -- --    09/14/23 0630 93 -- -- -- --    09/14/23 0600 93 room air -- -- --    09/14/23 0530 93 room air -- -- --    09/14/23 0500 92 room air -- -- --    09/14/23 0430 94 room air -- -- --    09/14/23 0400 92 room air -- -- --    09/14/23 0330 94 room air -- -- --    09/14/23 0300 97 room air -- -- --    09/14/23 0230 91 room air -- -- --    09/14/23 0200 90 room air -- -- --    09/14/23 0130 93 room air -- -- --    09/14/23 0100 93 room air -- -- --    09/14/23 0030 94 room air -- -- --    09/14/23 0015 94 room air -- -- --    09/14/23 0000 94 room air -- -- --          Facility-Administered Medications as of  9/15/2023   Medication Dose Route Frequency Provider Last Rate Last Admin    acetaminophen (TYLENOL) tablet 650 mg  650 mg Oral Q4H PRN Andrade Mcdowell MD        [COMPLETED] amiodarone 150 mg in 100 mL D5W (loading dose)  150 mg Intravenous Once Ila Gabriel APRN   Stopped at 09/13/23 1807    apixaban (ELIQUIS) tablet 5 mg  5 mg Oral BID Andrade Mcdowell MD   5 mg at 09/15/23 0817    atorvastatin (LIPITOR) tablet 40 mg  40 mg Oral Daily Andrade Mcdowell MD   40 mg at 09/15/23 0817    sennosides-docusate (PERICOLACE) 8.6-50 MG per tablet 2 tablet  2 tablet Oral BID Andrade Mcdowell MD        And    polyethylene glycol (MIRALAX) packet 17 g  17 g Oral Daily PRN Andrade Mcdowell MD        And    bisacodyl (DULCOLAX) EC tablet 5 mg  5 mg Oral Daily PRN Andrade Mcdowell MD        And    bisacodyl (DULCOLAX) suppository 10 mg  10 mg Rectal Daily PRN Andrade Mcdowell MD        [COMPLETED] dilTIAZem (CARDIZEM) injection 20 mg  20 mg Intravenous Once Ila Gabriel APRN   20 mg at 09/13/23 1551    flecainide (TAMBOCOR) tablet 100 mg  100 mg Oral Q12H Andrade Mcdowell MD   100 mg at 09/15/23 0817    [COMPLETED] hydroCHLOROthiazide (HYDRODIURIL) tablet 25 mg  25 mg Oral Daily Viki Gonzalez PA-C   25 mg at 09/14/23 1338    meperidine (DEMEROL) injection 12.5 mg  12.5 mg Intravenous Q5 Min PRN Jodie Tanner CRNA        metoprolol succinate XL (TOPROL-XL) 24 hr tablet 50 mg  50 mg Oral Q24H Andrade Mcdowell MD   50 mg at 09/15/23 0818    [COMPLETED] metoprolol tartrate (LOPRESSOR) injection 5 mg  5 mg Intravenous Once Ila Gabriel APRN   5 mg at 09/13/23 1811    naloxone (NARCAN) injection 0.4 mg  0.4 mg Intravenous PRN Jodie Tanner CRNA        nitroglycerin (NITROSTAT) SL tablet 0.4 mg  0.4 mg Sublingual Q5 Min PRN Andrade Mcdowell MD        nitroglycerin (NITROSTAT) SL tablet 0.4 mg  0.4 mg Sublingual Q5 Min PRN Andrade Mcdowell MD        ondansetron (ZOFRAN)  "injection 4 mg  4 mg Intravenous Q6H PRN Andrade Mcdowell MD        [COMPLETED] predniSONE (DELTASONE) tablet 50 mg  50 mg Oral Once Jacquelyn Santiago APRN   50 mg at 23 1043    sodium chloride 0.9 % flush 10 mL  10 mL Intravenous PRN Andrade Mcdowell MD        sodium chloride 0.9 % flush 10 mL  10 mL Intravenous Q12H Andrade Mcdowell MD   10 mL at 09/15/23 0818    sodium chloride 0.9 % flush 10 mL  10 mL Intravenous PRN Andrade Mcdowell MD        sodium chloride 0.9 % infusion 40 mL  40 mL Intravenous PRN Andrade Mcdowell MD            Physician Progress Notes (last 48 hours)        Viki Gonzalez PA-C at 23 1136              [facility] Medicine Services   Daily Progress Note    Patient Name: Nav Choudhary  : 1965  MRN: [id number]  Primary Care Physician:  Provider, No Known  Date of admission: 2023  Date and Time of Service: 23 at 1215      Subjective      Chief Complaint: Palpitations and some shortness of breath     Patient seen and examined today.  Patient states he continues to feel like his \"heart is racing\". He also notes some shortness of breath on exertion but he states he is \"used to it\". He denies any shortness of breath at rest, dizziness and denies any chest pain. Patient denies any other complaints at this time.     ROS 12 point ROS reviewed and negative except as mentioned above.      Objective      Vitals:   Temp:  [98.3 °F (36.8 °C)-98.4 °F (36.9 °C)] 98.4 °F (36.9 °C)  Heart Rate:  [125-149] 127  Resp:  [16-18] 18  BP: (113-149)/() 136/105    Physical Exam  Vitals and nursing note reviewed.   HENT:      Head: Normocephalic.   Eyes:      Extraocular Movements: Extraocular movements intact.      Pupils: Pupils are equal, round, and reactive to light.   Cardiovascular:      Rate and Rhythm: Regular rhythm. Tachycardia present.      Pulses: Normal pulses.   Pulmonary:      Effort: Pulmonary effort is normal.      Breath sounds: Normal breath " sounds.   Abdominal:      General: Bowel sounds are normal.      Palpations: Abdomen is soft.   Musculoskeletal:         General: Normal range of motion.   Skin:     General: Skin is warm and dry.   Neurological:      Mental Status: He is alert and oriented to person, place, and time.   Psychiatric:         Mood and Affect: Mood normal.         Result Review    Result Review:  I have personally reviewed the results from the time of this admission to 9/14/2023 11:36 EDT and agree with these findings:  [x]  Laboratory  [x]  Microbiology  [x]  Radiology  [x]  EKG/Telemetry   []  Cardiology/Vascular   []  Pathology  []  Old records  []  Other:  Most notable findings include:   CBC:      Lab 09/14/23  0415 09/13/23  1510   WBC 7.30 7.50   HEMOGLOBIN 14.3 14.7   HEMATOCRIT 42.7 43.9   PLATELETS 190 219   NEUTROS ABS 4.80 5.10   LYMPHS ABS 1.60 1.40   MONOS ABS 0.70 0.70   EOS ABS 0.20 0.10   MCV 90.9 89.9       CMP:        Lab 09/14/23  0755 09/14/23  0415 09/13/23  1510   SODIUM  --  138 140   POTASSIUM  --  4.5 4.0   CHLORIDE  --  105 107   CO2  --  25.0 22.0   ANION GAP  --  8.0 11.0   BUN  --  14 17   CREATININE  --  0.94 1.09   EGFR  --  95.1 79.7   GLUCOSE  --  107* 101*   CALCIUM  --  8.7 8.9   MAGNESIUM 2.1  --   --    TOTAL PROTEIN  --   --  6.1   ALBUMIN  --   --  4.0   GLOBULIN  --   --  2.1   ALT (SGPT)  --   --  22   AST (SGOT)  --   --  17   BILIRUBIN  --   --  0.4   ALK PHOS  --   --  81         Assessment & Plan      Brief Patient Summary:  Nav Choudhary is a 58 y.o. male with past medical history of atrial flutter off and on for 2 years and hypertension comes in with palpitation and shortness of breath secondary to atrial flutter with 2-1 conduction.        apixaban, 5 mg, Oral, BID  atorvastatin, 40 mg, Oral, Daily  dilTIAZem CD, 240 mg, Oral, Daily  diphenhydrAMINE, 50 mg, Oral, Once When Specified   Or  diphenhydrAMINE, 50 mg, Intravenous, Once When Specified   Or  diphenhydrAMINE, 50 mg,  Intramuscular, Once When Specified  metoprolol succinate XL, 50 mg, Oral, Q12H  predniSONE, 50 mg, Oral, Once   Followed by  predniSONE, 50 mg, Oral, Once  senna-docusate sodium, 2 tablet, Oral, BID  sodium chloride, 10 mL, Intravenous, Q12H       amiodarone, 0.5 mg/min, Last Rate: 0.5 mg/min (09/14/23 1043)         Active Hospital Problems:  Active Hospital Problems    Diagnosis     **Atrial flutter     Atrial fibrillation with RVR     Essential hypertension      Plan:      Atrial flutter with 2-1 conduction:  -Patient denies chest pain  -Put in observation at PCU.  -Continue amiodarone drip.  -Cardiology consult with Dr. Jade-  continue IV amiodarone, continue metoprolol and cardizem, per cardiology note  -Cardiac EP consult Dr. Mcdowell- plan for ablation, per his note.   -TSH 2.31  -Magnesium 2.1  -N.p.o. for possible ablation today  -We will continue home medications.     Hypertension:   Diastolic hypertension  -Blood pressure 136/105  -Continue home metoprolol and Cardizem  -Will give one dose of Hydrochlorothiazide 25mg to see if this improves his diastolic blood pressure   -Continue to monitor     DVT prophylaxis:  Medical DVT prophylaxis orders are present.    CODE STATUS:    Level Of Support Discussed With: Patient  Code Status (Patient has no pulse and is not breathing): CPR (Attempt to Resuscitate)  Medical Interventions (Patient has pulse or is breathing): Full Support      Disposition:  I expect patient to be discharged in 1-2 days.    This patient has been examined wearing appropriate Personal Protective Equipment and discussed with  attending physician Dr. Garcia . 09/14/23      Electronically signed by Viki Gonzalez PA-C, 09/14/23, 11:36 EDT.  [facility] Hospitalist Team             Electronically signed by Viki Gonzalez PA-C at 09/14/23 5274       Greg Jade MD at 09/14/23 1734          Referring Provider: Rhonda Garcia MD    Reason for follow-up:  Atrial flutter with RVR      Patient Care Team:  Provider, No Known as PCP - General    Subjective .      ROS    Since I have last seen, the patient has been without any chest discomfort ,shortness of breath, palpitations, dizziness or syncope.  Denies having any headache ,abdominal pain ,nausea, vomiting , diarrhea constipation, loss of weight or loss of appetite.  Denies having any excessive bruising ,hematuria or blood in the stool.    Review of all systems negative except as indicated    History  Past Medical History:   Diagnosis Date    Abnormal ECG 6/27/23    Arrhythmia 3/13/2021    Atrial fibrillation     HLD (hyperlipidemia)     Hypertension        Past Surgical History:   Procedure Laterality Date    CARDIAC CATHETERIZATION N/A 6/28/2023    Procedure: Left Heart Cath and coronary angiogram;  Surgeon: Greg Jade MD;  Location: [facility] CATH INVASIVE LOCATION;  Service: Cardiovascular;  Laterality: N/A;    KNEE ARTHROPLASTY         Family History   Problem Relation Age of Onset    Heart disease Mother     Heart disease Father     Heart attack Father        Social History     Tobacco Use    Smoking status: Never     Passive exposure: Past (AS A CHILD)    Smokeless tobacco: Never   Vaping Use    Vaping Use: Never used   Substance Use Topics    Alcohol use: Yes     Alcohol/week: 12.0 standard drinks     Types: 12 Cans of beer per week     Comment: Beer on occasion    Drug use: Never        (Not in a hospital admission)      Allergies  Iodine    Scheduled Meds:apixaban, 5 mg, Oral, BID  atorvastatin, 40 mg, Oral, Daily  dilTIAZem CD, 240 mg, Oral, Daily  metoprolol succinate XL, 50 mg, Oral, Q12H  senna-docusate sodium, 2 tablet, Oral, BID  sodium chloride, 10 mL, Intravenous, Q12H      Continuous Infusions:amiodarone, 0.5 mg/min, Last Rate: 0.5 mg/min (09/14/23 0020)      PRN Meds:.  acetaminophen    senna-docusate sodium **AND** polyethylene glycol **AND** bisacodyl **AND** bisacodyl    nitroglycerin    ondansetron    sodium  "chloride    sodium chloride    sodium chloride    Objective     VITAL SIGNS  Vitals:    09/14/23 0430 09/14/23 0500 09/14/23 0530 09/14/23 0600   BP: (!) 137/101 (!) 138/109 138/99 130/95   BP Location: Left arm Left arm Left arm Left arm   Patient Position: Lying Lying Lying Lying   Pulse: (!) 127 (!) 127 (!) 125 (!) 126   Resp:       Temp:       TempSrc:       SpO2: 94% 92% 93% 93%   Weight:       Height:           Flowsheet Rows      Flowsheet Row First Filed Value   Admission Height 190.5 cm (75\") Documented at 09/13/2023 1438   Admission Weight 122 kg (268 lb 15.4 oz) Documented at 09/13/2023 1438            No intake or output data in the 24 hours ending 09/14/23 0630     TELEMETRY: Atrial flutter with RVR    Physical Exam:  The patient is alert, oriented and in no distress.  Vital signs as noted above.  Head and neck revealed no carotid bruits or jugular venous distention.  No thyromegaly or lymphadenopathy is present  Lungs clear.  No wheezing.  Breath sounds are normal bilaterally.  Heart normal first and second heart sounds.  No murmur. No precordial rub is present.  No gallop is present.  Abdomen soft and nontender.  No organomegaly is present.  Extremities with good peripheral pulses without any pedal edema.  Skin warm and dry.  Musculoskeletal system is grossly normal  CNS grossly normal    Reviewed and updated.    Results Review:   I reviewed the patient's new clinical results.  Lab Results (last 24 hours)       Procedure Component Value Units Date/Time    CBC Auto Differential [[id number]]  (Normal) Collected: 09/14/23 0415    Specimen: Blood Updated: 09/14/23 0532     WBC 7.30 10*3/mm3      RBC 4.69 10*6/mm3      Hemoglobin 14.3 g/dL      Hematocrit 42.7 %      MCV 90.9 fL      MCH 30.4 pg      MCHC 33.5 g/dL      RDW 14.8 %      RDW-SD 46.4 fl      MPV 8.4 fL      Platelets 190 10*3/mm3      Neutrophil % 66.1 %      Lymphocyte % 21.4 %      Monocyte % 9.7 %      Eosinophil % 2.3 %      Basophil % 0.5 " %      Neutrophils, Absolute 4.80 10*3/mm3      Lymphocytes, Absolute 1.60 10*3/mm3      Monocytes, Absolute 0.70 10*3/mm3      Eosinophils, Absolute 0.20 10*3/mm3      Basophils, Absolute 0.00 10*3/mm3      nRBC 0.1 /100 WBC     Basic Metabolic Panel [[id number]]  (Abnormal) Collected: 09/14/23 0415    Specimen: Blood Updated: 09/14/23 0531     Glucose 107 mg/dL      BUN 14 mg/dL      Creatinine 0.94 mg/dL      Sodium 138 mmol/L      Potassium 4.5 mmol/L      Comment: Specimen hemolyzed.  Results may be affected.        Chloride 105 mmol/L      CO2 25.0 mmol/L      Calcium 8.7 mg/dL      BUN/Creatinine Ratio 14.9     Anion Gap 8.0 mmol/L      eGFR 95.1 mL/min/1.73     Narrative:      GFR Normal >60  Chronic Kidney Disease <60  Kidney Failure <15      Extra Tubes [[id number]] Collected: 09/13/23 1510    Specimen: Blood, Venous Line Updated: 09/13/23 1615    Narrative:      The following orders were created for panel order Extra Tubes.  Procedure                               Abnormality         Status                     ---------                               -----------         ------                     Gold Top - SST[[id number]]                                   Final result               Light Blue Top[[id number]]                                   Final result                 Please view results for these tests on the individual orders.    Gold Top - SST [[id number]] Collected: 09/13/23 1510    Specimen: Blood Updated: 09/13/23 1615     Extra Tube Hold for add-ons.     Comment: Auto resulted.       Light Blue Top [[id number]] Collected: 09/13/23 1510    Specimen: Blood Updated: 09/13/23 1615     Extra Tube Hold for add-ons.     Comment: Auto resulted       Comprehensive Metabolic Panel [[id number]]  (Abnormal) Collected: 09/13/23 1510    Specimen: Blood Updated: 09/13/23 1543     Glucose 101 mg/dL      BUN 17 mg/dL      Creatinine 1.09 mg/dL      Sodium 140 mmol/L      Potassium 4.0 mmol/L      Chloride 107 mmol/L       CO2 22.0 mmol/L      Calcium 8.9 mg/dL      Total Protein 6.1 g/dL      Albumin 4.0 g/dL      ALT (SGPT) 22 U/L      AST (SGOT) 17 U/L      Alkaline Phosphatase 81 U/L      Total Bilirubin 0.4 mg/dL      Globulin 2.1 gm/dL      A/G Ratio 1.9 g/dL      BUN/Creatinine Ratio 15.6     Anion Gap 11.0 mmol/L      eGFR 79.7 mL/min/1.73     Narrative:      GFR Normal >60  Chronic Kidney Disease <60  Kidney Failure <15      Single High Sensitivity Troponin T [[id number]]  (Normal) Collected: 09/13/23 1510    Specimen: Blood Updated: 09/13/23 1543     HS Troponin T 8 ng/L     Narrative:      High Sensitive Troponin T Reference Range:  <10.0 ng/L- Negative Female for AMI  <15.0 ng/L- Negative Male for AMI  >=10 - Abnormal Female indicating possible myocardial injury.  >=15 - Abnormal Male indicating possible myocardial injury.   Clinicians would have to utilize clinical acumen, EKG, Troponin, and serial changes to determine if it is an Acute Myocardial Infarction or myocardial injury due to an underlying chronic condition.         BNP [[id number]]  (Abnormal) Collected: 09/13/23 1510    Specimen: Blood Updated: 09/13/23 1543     proBNP 1,230.0 pg/mL     Narrative:      Among patients with dyspnea, NT-proBNP is highly sensitive for the detection of acute congestive heart failure. In addition NT-proBNP of <300 pg/ml effectively rules out acute congestive heart failure with 99% negative predictive value.      CBC & Differential [[id number]]  (Abnormal) Collected: 09/13/23 1510    Specimen: Blood Updated: 09/13/23 1521    Narrative:      The following orders were created for panel order CBC & Differential.  Procedure                               Abnormality         Status                     ---------                               -----------         ------                     CBC Auto Differential[[id number]]        Abnormal            Final result                 Please view results for these tests on the individual orders.    CBC  Auto Differential [[id number]]  (Abnormal) Collected: 09/13/23 1510    Specimen: Blood Updated: 09/13/23 1521     WBC 7.50 10*3/mm3      RBC 4.88 10*6/mm3      Hemoglobin 14.7 g/dL      Hematocrit 43.9 %      MCV 89.9 fL      MCH 30.2 pg      MCHC 33.6 g/dL      RDW 14.8 %      RDW-SD 45.9 fl      MPV 8.2 fL      Platelets 219 10*3/mm3      Neutrophil % 68.3 %      Lymphocyte % 18.9 %      Monocyte % 9.8 %      Eosinophil % 2.0 %      Basophil % 1.0 %      Neutrophils, Absolute 5.10 10*3/mm3      Lymphocytes, Absolute 1.40 10*3/mm3      Monocytes, Absolute 0.70 10*3/mm3      Eosinophils, Absolute 0.10 10*3/mm3      Basophils, Absolute 0.10 10*3/mm3      nRBC 0.1 /100 WBC             Imaging Results (Last 24 Hours)       Procedure Component Value Units Date/Time    XR Chest 1 View [[id number]] Collected: 09/13/23 1537     Updated: 09/13/23 1540    Narrative:      XR CHEST 1 VW    Date of Exam: 9/13/2023 3:24 PM EDT    Indication: CHF/COPD Protocol CHF/COPD Protocol    Comparison: None available.    Findings:  Cardiomediastinal silhouette is within normal limits, considering portable technique. No focal consolidation or overt pulmonary edema. No pleural effusion or pneumothorax. Osseous structures are unremarkable.      Impression:      Impression:  No evidence of acute cardiopulmonary disease.    Electronically Signed: Flip Springer MD    9/13/2023 3:38 PM EDT    Workstation ID: [id number]        LAB RESULTS (LAST 7 DAYS)    CBC  Results from last 7 days   Lab Units 09/14/23  0415 09/13/23  1510   WBC 10*3/mm3 7.30 7.50   RBC 10*6/mm3 4.69 4.88   HEMOGLOBIN g/dL 14.3 14.7   HEMATOCRIT % 42.7 43.9   MCV fL 90.9 89.9   PLATELETS 10*3/mm3 190 219       BMP  Results from last 7 days   Lab Units 09/14/23  0415 09/13/23  1510   SODIUM mmol/L 138 140   POTASSIUM mmol/L 4.5 4.0   CHLORIDE mmol/L 105 107   CO2 mmol/L 25.0 22.0   BUN mg/dL 14 17   CREATININE mg/dL 0.94 1.09   GLUCOSE mg/dL 107* 101*       CMP   Results from last 7  days   Lab Units 09/14/23  0415 09/13/23  1510   SODIUM mmol/L 138 140   POTASSIUM mmol/L 4.5 4.0   CHLORIDE mmol/L 105 107   CO2 mmol/L 25.0 22.0   BUN mg/dL 14 17   CREATININE mg/dL 0.94 1.09   GLUCOSE mg/dL 107* 101*   ALBUMIN g/dL  --  4.0   BILIRUBIN mg/dL  --  0.4   ALK PHOS U/L  --  81   AST (SGOT) U/L  --  17   ALT (SGPT) U/L  --  22         BNP        TROPONIN  Results from last 7 days   Lab Units 09/13/23  1510   HSTROP T ng/L 8       CoAg        Creatinine Clearance  Estimated Creatinine Clearance: 123.5 mL/min (by C-G formula based on SCr of 0.94 mg/dL).    ABG        Radiology  XR Chest 1 View    Result Date: 9/13/2023  Impression: No evidence of acute cardiopulmonary disease. Electronically Signed: Flip Springer MD  9/13/2023 3:38 PM EDT  Workstation ID: [id number]         EKG      I personally viewed and interpreted the patient's EKG/Telemetry data:    ECHOCARDIOGRAM:    Results for orders placed during the hospital encounter of 06/26/23    Adult Transthoracic Echo Complete W/ Cont if Necessary Per Protocol    Interpretation Summary    Left ventricular ejection fraction appears to be 56 - 60%.    Indications  Arrhythmia    Technically satisfactory study.  Mitral valve is structurally normal.  Tricuspid valve is structurally normal.  Aortic valve is structurally normal.  Pulmonic valve could not be well visualized.  No evidence for mitral tricuspid or aortic regurgitation is seen by Doppler study.  Left atrium is normal in size.  Right atrium is normal in size.  Left ventricle is normal in size and proximal inferior and posterior wall hypokinesis.  Right ventricle is normal in size.  Atrial septum is intact.  Aorta is normal.  No pericardial effusion or intracardiac thrombus is seen.    Impression  Structurally and functionally normal cardiac valves.  Left ventricular size and proximal posterior and inferior wall hypokinesis with ejection fraction of 60 %.          STRESS TEST  Results for orders placed  during the hospital encounter of 06/26/23    Stress Test With Myocardial Perfusion One Day    Interpretation Summary  Indications  Palpitations    This study was performed under the direct supervision of Hanna GARCIA.    Resting ECG  Sinus rhythm    The patient was injected with Lexiscan intravenously while constantly monitoring electrocardiogram and vital signs.  Patient did not have any chest discomfort ST abnormalities or ectopy with injection of Lexiscan.    Cardiolite was used as an imaging agent.    Cardiolite images showed decreased radionuclide uptake in the posterior lateral segments with partial reperfusion.    Gated SPECT images revealed normal left ventricle size and contractility with ejection fraction of 56%.    Impression  ========  Lexiscan Cardiolite test is abnormal with posterolateral infarction and ischemia.    Gated SPECT images revealed normal left ventricular size and contractility with ejection fraction of 56%.        Cardiolite (Tc-99m sestamibi) stress test    CARDIAC CATHETERIZATION  Results for orders placed during the hospital encounter of 06/26/23    Cardiac Catheterization/Vascular Study    Narrative  CARDIAC CATHETERIZATION REPORT    DATE OF PROCEDURE:  6/28/2023    INDICATION FOR PROCEDURE:  Shortness of breath  A-fib with RVR  Abnormal stress Cardiolite test    PROCEDURE PERFORMED:  Left heart catheterization, coronary angiography, left ventriculography    @@  Moderate conscious sedation was utilized with intravenous Versed and fentanyl administered by registered nurse with continuous ECG, pulse oximetry and hemodynamic monitoring supervised by myself throughout the entire procedure.  Conscious sedation time   30 minutes    I was present with the patient for the duration of moderate sedation and supervised staff who had no other duties and monitored the patient for the entire procedure.    @@  PROCEDURE COMMENTS:    Under usual sterile precautions and 1% Xylocaine infiltration right  femoral artery was percutaneously punctured and a 5 Swedish vascular sheath was introduced into the right femoral artery.  Left and right coronary arteriography was performed in varying degrees of obliquity followed by left ventricular angiogram.  Patient has tortuosity of the right common iliac artery and all catheters were exchanged using exchange guidewire.  Hemostasis was obtained and patient was returned to the room with intact pulses.  No complications were noted.    FINDINGS:    1. HEMODYNAMICS:  Left ventricle end-diastolic pressure is normal.  No gradient was noted across the aortic valve.    2. LEFT VENTRICULOGRAPHY:  Left ventricular size and contractility is normal with ejection fraction of 60%.  No mitral regurgitation is present.    3. CORONARY ANGIOGRAPHY:  Left main coronary artery is normal.  Left anterior descending artery is normal.  Circumflex coronary artery is normal.  Right coronary artery is a large and dominant vessel and is normal.    SUMMARY:  Normal left ventricular size and contractility with ejection fraction of 60%.  Normal epicardial coronary arteries.    RECOMMENDATIONS:  Noncardiac work-up.  Continue aspirin and beta-blocker for atrial fibrillation.  Anticoagulation versus 30-day event monitor to assess A-fib burden and consider anticoagulation.                OTHER:         Assessment & Plan     Principal Problem:    Atrial flutter      ]]]]]]]]]]]]]]]]]]]]  History  =============  - Patient has atrial flutter with RVR.    - History of atrial fibrillation with RVR.  Patient has increasing frequency of palpitations.  Patient had work-up in [place] approximately 2 years ago.  Patient was maintaining sinus rhythm.  Patient is now having paroxysmal atrial fibrillation     - Shortness of breath     - Mild elevation of troponin at 180-flat (high-sensitivity troponin)  EKG showed no acute changes     Echocardiogram-6/27/2023 showed proximal posterior and inferior hypokinesis.  Lexiscan  Cardiolite test is abnormal with proximal posterior wall ischemia.  2023     Cardiac cath 2023 revealed normal left ventricle function and normal coronary arteries.     - Hypertension dyslipidemia     - Exogenous obesity     Status post bilateral knee replacements     - Non-smoker     - Occasional beer drinking     - Allergic to iodine  =============  Plan  ==============  Atrial flutter with RVR.  Patient was not responding to medications as outpatient.  Patient is on metoprolol and p.o. Cardizem.  Patient did not respond to intravenous Cardizem.  Patient was started on intravenous amiodarone.  Patient to have consideration for a flutter ablation.    Past history of history of A-fib with RVR.  Patient has converted to sinus rhythm.  Past history of A-fib ablation 2 years ago in [place].     Hypertension-124/87     Dyslipidemia-continue atorvastatin    Anticoagulation-patient is on Eliquis.    Check TSH and magnesium level.    Continue intravenous amiodarone.  Have discussed with Dr. Mcdowell regarding the ablation for atrial flutter.  Continue metoprolol and p.o. Cardizem.  Patient is scheduled for atrial flutter ablation today.    Risks and benefits pros and cons of the procedure for atrial flutter was discussed.    Further plan will depend on patient's progress.    Reviewed and updated-2023  ]]]]]]]]]]]]]]]]]]]]]]              Greg Jade MD  23  06:30 EDT                Electronically signed by Greg Jade MD at 23 0965          Consult Notes (last 48 hours)        Andrade Mcdowell MD at 23 0886           HP      Name: Nav Choudhary ADMIT: 2023   : 1965  PCP: Provider, No Known    MRN: [id number] LOS: 0 days   AGE/SEX: 58 y.o. male  ROOM:      Chief Complaint   Patient presents with    Irregular Heart Beat       Subjective        History of present illness  Nav Choudhary is a 58-year-old male patient who has no history of coronary artery  disease, is admitted to the hospital for palpitations and tachycardia.  He was first diagnosed with atrial arrhythmias in July 2023.    Past Medical History:   Diagnosis Date    Abnormal ECG 6/27/23    Arrhythmia 3/13/2021    Atrial fibrillation     HLD (hyperlipidemia)     Hypertension      Past Surgical History:   Procedure Laterality Date    CARDIAC CATHETERIZATION N/A 6/28/2023    Procedure: Left Heart Cath and coronary angiogram;  Surgeon: Greg Jade MD;  Location: [facility] CATH INVASIVE LOCATION;  Service: Cardiovascular;  Laterality: N/A;    KNEE ARTHROPLASTY       Family History   Problem Relation Age of Onset    Heart disease Mother     Heart disease Father     Heart attack Father      Social History     Tobacco Use    Smoking status: Never     Passive exposure: Past (AS A CHILD)    Smokeless tobacco: Never   Vaping Use    Vaping Use: Never used   Substance Use Topics    Alcohol use: Yes     Alcohol/week: 12.0 standard drinks     Types: 12 Cans of beer per week     Comment: Beer on occasion    Drug use: Never     (Not in a hospital admission)    Allergies:  Iodine    Review of systems    Constitutional: Negative.    Respiratory and cardiovascular: As detailed in HPI section.  Gastrointestinal: Negative for constipation, nausea and vomiting negative for abdominal distention, abdominal pain and diarrhea.   Genitourinary: Negative for difficulty urinating and flank pain.   Musculoskeletal: Negative for arthralgias, joint swelling and myalgias.   Skin: Negative for color change, rash and wound.   Neurological: Negative for dizziness, syncope, weakness and headaches.   Hematological: Negative for adenopathy.   Psychiatric/Behavioral: Negative for confusion.   All other systems reviewed and are negative.       Physical Exam  VITALS REVIEWED    General:      well developed, in no acute distress.    Head:      normocephalic and atraumatic.    Eyes:      PERRL/EOM intact, conjunctiva and sclera clear with out  nystagmus.    Neck:      no masses, thyromegaly,  trachea central with normal respiratory effort and PMI displaced laterally  Lungs:      Clear to auscultation bilaterally  Heart:       Irregular rhythm, tachycardic  Msk:      no deformity or scoliosis noted of thoracic or lumbar spine.    Pulses:      pulses normal in all 4 extremities.    Extremities:       No lower extremity edema  Neurologic:      no focal deficits.   alert oriented x3  Skin:      intact without lesions or rashes.    Psych:      alert and cooperative; normal mood and affect; normal attention span and concentration.      Result Review :               Pertinent cardiac workup    EKG 9/13/2023 atrial flutter ventricular rate of 146 bpm.  Event monitor 6/28/2023 for 25 days shows both atrial fibrillation and atrial flutter.  Heart cath 7/10/2023 normal coronaries  Echo 6/27/2023 ejection fraction 55 to 60%.        Assessment and Plan         Atrial flutter    Atrial fibrillation with RVR    Essential hypertension      Nav Choudhary is a 58-year-old male patient who was diagnosed with atrial flutter and atrial fibrillation few months back, is admitted to the hospital due to tachycardia.  It was paroxysmal at first on his event monitor in June 2023, however patient developed palpitations couple of days back and symptoms did not subside.  Who presented to the office and was found to be in atrial flutter at a rate of 150 bpm.  Patient was advised to come to the ER.  Here he remains in atrial flutter.  He is currently receiving IV amiodarone.  At home he had been on Toprol-XL, Cardizem and Eliquis.  Plan for ablation.      No follow-ups on file.  Patient was given instructions and counseling regarding his condition or for health maintenance advice. Please see specific information pulled into the AVS if appropriate.      Electronically signed by Andrade Mcdowell MD at 09/14/23 6001       Greg Jade MD at 09/13/23 9138            Referring  Provider: Daniele Tamayo DO  Reason for Consultation:  Persistent atrial flutter    Patient Care Team:  Provider, No Known as PCP - General    Chief complaint  Abnormal heart rhythm    Subjective .     History of present illness:  Nav Choudhary is a 58 y.o. male who presents with history of abnormal heart rhythm.  Patient has history of atrial fibrillation.  Patient had ablation in the past.  Patient recently has been having recurrent and persistent atrial flutter.  Patient has been on Cardizem and metoprolol as well as anticoagulation with Eliquis.  Patient was seen in the office and patient was advised admission to the hospital since patient had atrial flutter with rate of 145/min.  Patient wanted to wait and return to the ER today.  Patient initially was started on intravenous Cardizem and patient did not respond to decreased rate.  Patient was started on intravenous amiodarone.    ROS      The patient has been without any chest discomfort, shortness of breath, palpitations, dizziness or syncope.  Denies having any headache, abdominal pain, nausea, vomiting, diarrhea, constipation, loss of weight or loss of appetite.  Denies having any excessive bruising, hematuria or blood in the stool.    Review of all systems negative except as indicated      History  Past Medical History:   Diagnosis Date    Abnormal ECG 6/27/23    Arrhythmia 3/13/2021    Atrial fibrillation     HLD (hyperlipidemia)     Hypertension        Past Surgical History:   Procedure Laterality Date    CARDIAC CATHETERIZATION N/A 6/28/2023    Procedure: Left Heart Cath and coronary angiogram;  Surgeon: Greg Jade MD;  Location: [facility] CATH INVAS[facility];  Service: Cardiovascular;  Laterality: N/A;    KNEE ARTHROPLASTY         Family History   Problem Relation Age of Onset    Heart disease Mother     Heart disease Father     Heart attack Father        Social History     Tobacco Use    Smoking status: Never     Passive exposure: Past  "(AS A CHILD)    Smokeless tobacco: Never   Vaping Use    Vaping Use: Never used   Substance Use Topics    Alcohol use: Yes     Alcohol/week: 12.0 standard drinks     Types: 12 Cans of beer per week     Comment: Beer on occasion    Drug use: Never        (Not in a hospital admission)        Iodine    Scheduled Meds:   Continuous Infusions:amiodarone, 1 mg/min, Last Rate: 1 mg/min (09/13/23 1804)   Followed by  [START ON 9/14/2023] amiodarone, 0.5 mg/min      PRN Meds:.  sodium chloride    Objective     VITAL SIGNS  Vitals:    09/13/23 1438 09/13/23 1629 09/13/23 1747 09/13/23 1811   BP:  126/93 128/92 129/93   Pulse:  (!) 146 (!) 144 (!) 148   Resp: 16 18     Temp: 98.3 °F (36.8 °C)      TempSrc: Oral      SpO2:  98%     Weight: 122 kg (268 lb 15.4 oz)      Height: 190.5 cm (75\")          Flowsheet Rows      Flowsheet Row First Filed Value   Admission Height 190.5 cm (75\") Documented at 09/13/2023 1438   Admission Weight 122 kg (268 lb 15.4 oz) Documented at 09/13/2023 1438            No intake or output data in the 24 hours ending 09/13/23 1825     TELEMETRY: Atrial flutter with rapid ventricular response.    Physical Exam:  The patient is alert, oriented and in no distress.  Vital signs as noted above.  Head and neck revealed no carotid bruits or jugular venous distention.  No thyromegaly or lymphadenopathy is present  Lungs clear.  No wheezing.  Breath sounds are normal bilaterally.  Heart normal first and second heart sounds. No murmur.  No precordial rub is present.  No gallop is present.  Abdomen soft and nontender.  No organomegaly is present.  Extremities with good peripheral pulses without any pedal edema.  Skin warm and dry.  Musculoskeletal system is grossly normal  CNS grossly normal    Reviewed and updated.    Results Review:   I reviewed the patient's new clinical results.  Lab Results (last 24 hours)       Procedure Component Value Units Date/Time    Extra Tubes [[id number]] Collected: 09/13/23 1510    " Specimen: Blood, Venous Line Updated: 09/13/23 1615    Narrative:      The following orders were created for panel order Extra Tubes.  Procedure                               Abnormality         Status                     ---------                               -----------         ------                     Gold Top - SST[[id number]]                                   Final result               Light Blue Top[[id number]]                                   Final result                 Please view results for these tests on the individual orders.    Gold Top - SST [[id number]] Collected: 09/13/23 1510    Specimen: Blood Updated: 09/13/23 1615     Extra Tube Hold for add-ons.     Comment: Auto resulted.       Light Blue Top [[id number]] Collected: 09/13/23 1510    Specimen: Blood Updated: 09/13/23 1615     Extra Tube Hold for add-ons.     Comment: Auto resulted       Comprehensive Metabolic Panel [[id number]]  (Abnormal) Collected: 09/13/23 1510    Specimen: Blood Updated: 09/13/23 1543     Glucose 101 mg/dL      BUN 17 mg/dL      Creatinine 1.09 mg/dL      Sodium 140 mmol/L      Potassium 4.0 mmol/L      Chloride 107 mmol/L      CO2 22.0 mmol/L      Calcium 8.9 mg/dL      Total Protein 6.1 g/dL      Albumin 4.0 g/dL      ALT (SGPT) 22 U/L      AST (SGOT) 17 U/L      Alkaline Phosphatase 81 U/L      Total Bilirubin 0.4 mg/dL      Globulin 2.1 gm/dL      A/G Ratio 1.9 g/dL      BUN/Creatinine Ratio 15.6     Anion Gap 11.0 mmol/L      eGFR 79.7 mL/min/1.73     Narrative:      GFR Normal >60  Chronic Kidney Disease <60  Kidney Failure <15      Single High Sensitivity Troponin T [[id number]]  (Normal) Collected: 09/13/23 1510    Specimen: Blood Updated: 09/13/23 1543     HS Troponin T 8 ng/L     Narrative:      High Sensitive Troponin T Reference Range:  <10.0 ng/L- Negative Female for AMI  <15.0 ng/L- Negative Male for AMI  >=10 - Abnormal Female indicating possible myocardial injury.  >=15 - Abnormal Male indicating possible  myocardial injury.   Clinicians would have to utilize clinical acumen, EKG, Troponin, and serial changes to determine if it is an Acute Myocardial Infarction or myocardial injury due to an underlying chronic condition.         BNP [[id number]]  (Abnormal) Collected: 09/13/23 1510    Specimen: Blood Updated: 09/13/23 1543     proBNP 1,230.0 pg/mL     Narrative:      Among patients with dyspnea, NT-proBNP is highly sensitive for the detection of acute congestive heart failure. In addition NT-proBNP of <300 pg/ml effectively rules out acute congestive heart failure with 99% negative predictive value.      CBC & Differential [[id number]]  (Abnormal) Collected: 09/13/23 1510    Specimen: Blood Updated: 09/13/23 1521    Narrative:      The following orders were created for panel order CBC & Differential.  Procedure                               Abnormality         Status                     ---------                               -----------         ------                     CBC Auto Differential[[id number]]        Abnormal            Final result                 Please view results for these tests on the individual orders.    CBC Auto Differential [[id number]]  (Abnormal) Collected: 09/13/23 1510    Specimen: Blood Updated: 09/13/23 1521     WBC 7.50 10*3/mm3      RBC 4.88 10*6/mm3      Hemoglobin 14.7 g/dL      Hematocrit 43.9 %      MCV 89.9 fL      MCH 30.2 pg      MCHC 33.6 g/dL      RDW 14.8 %      RDW-SD 45.9 fl      MPV 8.2 fL      Platelets 219 10*3/mm3      Neutrophil % 68.3 %      Lymphocyte % 18.9 %      Monocyte % 9.8 %      Eosinophil % 2.0 %      Basophil % 1.0 %      Neutrophils, Absolute 5.10 10*3/mm3      Lymphocytes, Absolute 1.40 10*3/mm3      Monocytes, Absolute 0.70 10*3/mm3      Eosinophils, Absolute 0.10 10*3/mm3      Basophils, Absolute 0.10 10*3/mm3      nRBC 0.1 /100 WBC             Imaging Results (Last 24 Hours)       Procedure Component Value Units Date/Time    XR Chest 1 View [[id number]]  Collected: 09/13/23 1537     Updated: 09/13/23 1540    Narrative:      XR CHEST 1 VW    Date of Exam: 9/13/2023 3:24 PM EDT    Indication: CHF/COPD Protocol CHF/COPD Protocol    Comparison: None available.    Findings:  Cardiomediastinal silhouette is within normal limits, considering portable technique. No focal consolidation or overt pulmonary edema. No pleural effusion or pneumothorax. Osseous structures are unremarkable.      Impression:      Impression:  No evidence of acute cardiopulmonary disease.    Electronically Signed: Flip Springer MD    9/13/2023 3:38 PM EDT    Workstation ID: [id number]        LAB RESULTS (LAST 7 DAYS)    CBC  Results from last 7 days   Lab Units 09/13/23  1510   WBC 10*3/mm3 7.50   RBC 10*6/mm3 4.88   HEMOGLOBIN g/dL 14.7   HEMATOCRIT % 43.9   MCV fL 89.9   PLATELETS 10*3/mm3 219       BMP  Results from last 7 days   Lab Units 09/13/23  1510   SODIUM mmol/L 140   POTASSIUM mmol/L 4.0   CHLORIDE mmol/L 107   CO2 mmol/L 22.0   BUN mg/dL 17   CREATININE mg/dL 1.09   GLUCOSE mg/dL 101*       CMP   Results from last 7 days   Lab Units 09/13/23  1510   SODIUM mmol/L 140   POTASSIUM mmol/L 4.0   CHLORIDE mmol/L 107   CO2 mmol/L 22.0   BUN mg/dL 17   CREATININE mg/dL 1.09   GLUCOSE mg/dL 101*   ALBUMIN g/dL 4.0   BILIRUBIN mg/dL 0.4   ALK PHOS U/L 81   AST (SGOT) U/L 17   ALT (SGPT) U/L 22         BNP        TROPONIN  Results from last 7 days   Lab Units 09/13/23  1510   HSTROP T ng/L 8       CoAg        Creatinine Clearance  Estimated Creatinine Clearance: 106.5 mL/min (by C-G formula based on SCr of 1.09 mg/dL).    ABG        Radiology  XR Chest 1 View    Result Date: 9/13/2023  Impression: No evidence of acute cardiopulmonary disease. Electronically Signed: Flip Springer MD  9/13/2023 3:38 PM EDT  Workstation ID: [id number]       EKG      I personally viewed and interpreted the patient's EKG/Telemetry data: Atrial flutter with RVR.    ECHOCARDIOGRAM:    Results for orders placed during the  hospital encounter of 06/26/23    Adult Transthoracic Echo Complete W/ Cont if Necessary Per Protocol    Interpretation Summary    Left ventricular ejection fraction appears to be 56 - 60%.    Indications  Arrhythmia    Technically satisfactory study.  Mitral valve is structurally normal.  Tricuspid valve is structurally normal.  Aortic valve is structurally normal.  Pulmonic valve could not be well visualized.  No evidence for mitral tricuspid or aortic regurgitation is seen by Doppler study.  Left atrium is normal in size.  Right atrium is normal in size.  Left ventricle is normal in size and proximal inferior and posterior wall hypokinesis.  Right ventricle is normal in size.  Atrial septum is intact.  Aorta is normal.  No pericardial effusion or intracardiac thrombus is seen.    Impression  Structurally and functionally normal cardiac valves.  Left ventricular size and proximal posterior and inferior wall hypokinesis with ejection fraction of 60 %.      STRESS TEST  Results for orders placed during the hospital encounter of 06/26/23    Stress Test With Myocardial Perfusion One Day    Interpretation Summary  Indications  Palpitations    This study was performed under the direct supervision of Hanna GARCIA.    Resting ECG  Sinus rhythm    The patient was injected with Lexiscan intravenously while constantly monitoring electrocardiogram and vital signs.  Patient did not have any chest discomfort ST abnormalities or ectopy with injection of Lexiscan.    Cardiolite was used as an imaging agent.    Cardiolite images showed decreased radionuclide uptake in the posterior lateral segments with partial reperfusion.    Gated SPECT images revealed normal left ventricle size and contractility with ejection fraction of 56%.    Impression  ========  Lexiscan Cardiolite test is abnormal with posterolateral infarction and ischemia.    Gated SPECT images revealed normal left ventricular size and contractility with ejection fraction  of 56%.        Cardiolite (Tc-99m sestamibi) stress test    HEART CATHETERIZATION  Results for orders placed during the hospital encounter of 06/26/23    Cardiac Catheterization/Vascular Study    Narrative  CARDIAC CATHETERIZATION REPORT    DATE OF PROCEDURE:  6/28/2023    INDICATION FOR PROCEDURE:  Shortness of breath  A-fib with RVR  Abnormal stress Cardiolite test    PROCEDURE PERFORMED:  Left heart catheterization, coronary angiography, left ventriculography    @@  Moderate conscious sedation was utilized with intravenous Versed and fentanyl administered by registered nurse with continuous ECG, pulse oximetry and hemodynamic monitoring supervised by myself throughout the entire procedure.  Conscious sedation time   30 minutes    I was present with the patient for the duration of moderate sedation and supervised staff who had no other duties and monitored the patient for the entire procedure.    @@  PROCEDURE COMMENTS:    Under usual sterile precautions and 1% Xylocaine infiltration right femoral artery was percutaneously punctured and a 5 Spanish vascular sheath was introduced into the right femoral artery.  Left and right coronary arteriography was performed in varying degrees of obliquity followed by left ventricular angiogram.  Patient has tortuosity of the right common iliac artery and all catheters were exchanged using exchange guidewire.  Hemostasis was obtained and patient was returned to the room with intact pulses.  No complications were noted.    FINDINGS:    1. HEMODYNAMICS:  Left ventricle end-diastolic pressure is normal.  No gradient was noted across the aortic valve.    2. LEFT VENTRICULOGRAPHY:  Left ventricular size and contractility is normal with ejection fraction of 60%.  No mitral regurgitation is present.    3. CORONARY ANGIOGRAPHY:  Left main coronary artery is normal.  Left anterior descending artery is normal.  Circumflex coronary artery is normal.  Right coronary artery is a large and  dominant vessel and is normal.    SUMMARY:  Normal left ventricular size and contractility with ejection fraction of 60%.  Normal epicardial coronary arteries.    RECOMMENDATIONS:  Noncardiac work-up.  Continue aspirin and beta-blocker for atrial fibrillation.  Anticoagulation versus 30-day event monitor to assess A-fib burden and consider anticoagulation.      OTHER:     Assessment & Plan     Active Problems:    * No active hospital problems. *      Assessment and Plan         ]]]]]]]]]]]]]]]]]]]]]  History  =============  - Patient has atrial flutter with RVR.    - History of atrial fibrillation with RVR.  Patient has increasing frequency of palpitations.  Patient had work-up in [place] approximately 2 years ago.  Patient was maintaining sinus rhythm.  Patient is now having paroxysmal atrial fibrillation     - Shortness of breath     - Mild elevation of troponin at 180-flat (high-sensitivity troponin)  EKG showed no acute changes     Echocardiogram-6/27/2023 showed proximal posterior and inferior hypokinesis.  Lexiscan Cardiolite test is abnormal with proximal posterior wall ischemia.  6/27/2023     Cardiac cath 6/28/2023 revealed normal left ventricle function and normal coronary arteries.     - Hypertension dyslipidemia     - Exogenous obesity     Status post bilateral knee replacements     - Non-smoker     - Occasional beer drinking     - Allergic to iodine  =============  Plan  ==============  Atrial flutter with RVR.  Patient was not responding to medications as outpatient.  Patient is on metoprolol and p.o. Cardizem.  Patient did not respond to intravenous Cardizem.  Patient was started on intravenous amiodarone.  Patient to have consideration for a flutter ablation.    Past history of history of A-fib with RVR.  Patient has converted to sinus rhythm.  Past history of A-fib ablation 2 years ago in [place].     Hypertension-124/87     Dyslipidemia-continue atorvastatin    Anticoagulation-patient is on  Eliquis.    Continue intravenous amiodarone.  Have discussed with Dr. Mcdowell regarding the ablation for atrial flutter.  Continue metoprolol and p.o. Cardizem.    Risks and benefits pros and cons of the procedure for atrial flutter was discussed.    Further plan will depend on patient's progress.    Reviewed and updated-9/13/2023  ]]]]]]]]]]]]]]]]]]]]]]             Greg Jade MD  09/13/23  18:25 EDT              Electronically signed by Greg Jade MD at 09/14/23 09

## 2023-09-15 NOTE — DISCHARGE SUMMARY
[facility]  DISCHARGE SUMMARY    Patient Name: Nav Choudhary  : 1965  MRN: [id number]    Date of Admission: 2023  Discharge Diagnosis:  1.  Atrial flutter with RVR, status post ablation ()  2.  Hypertension  3.  Obesity, BMI of 32  4.  Hyperlipidemia    Date of Discharge:    9/15/2023  Primary Care Physician: Provider, No Known      Presenting Problem:   Atrial flutter [I48.92]  Palpitations [R00.2]  Atrial fibrillation with RVR [I48.91]  Typical atrial flutter [I48.3]    Active and Resolved Hospital Problems:  Active Hospital Problems    Diagnosis POA    **Atrial flutter [I48.92] Yes    Atrial fibrillation with RVR [I48.91] Yes    Essential hypertension [I10] Yes      Resolved Hospital Problems   No resolved problems to display.         Hospital Course     Brief HPI/Hospital course: 58-year-old pleasant, obese male with BMI of 32, hypertension and A- fibrillation/flutter .  Patient was admitted on 2023, presented to [facility] ED complaining of palpitation and shortness of breath for the past few days.  He went to see his cardiologist, Dr. Jade due to symptomatology and was advised to present to the emergency room for further evaluation.  He denies fever, chills, no adrianna chest pain, dizziness or lightheadedness.  No abdominal pain, nausea or vomiting.  High sensitive troponin of 8 and proBNP of 1231.  EKG showed atrial flutter with RVR.  Patient was admitted with a principal diagnosis of atrial flutter with associated palpitation and shortness of breath.  He was seen in consultation by cardiologist and underwent ablation resumption of sinus rhythm and rate control.  He reported feeling much better, up and ambulating in the room without any dizziness, lightheadedness chest discomfort.  His brief hospital course has been relatively uneventful.  Cardiologist recommended continue with cardiac meds including audiologist recommended that he continue  with flecainide, Toprol-XL and anticoagulation with Eliquis.    DISCHARGE Follow Up Recommendations for labs and diagnostics:   1.  Follow-up with PCP and cardiology as scheduled    Reasons For Change In Medications and Indications for New Medications:      Day of Discharge     Vital Signs:  Temp:  [97.9 °F (36.6 °C)-98.2 °F (36.8 °C)] 98.2 °F (36.8 °C)  Heart Rate:  [] 73  Resp:  [10-22] 22  BP: (110-142)/() 123/72    Physical Exam  Constitutional:       General: He is not in acute distress.     Appearance: He is obese. He is not ill-appearing.   HENT:      Head: Normocephalic.      Right Ear: Tympanic membrane normal.      Nose: Nose normal.      Mouth/Throat:      Mouth: Mucous membranes are moist.   Cardiovascular:      Rate and Rhythm: Normal rate.      Pulses: Normal pulses.   Pulmonary:      Effort: Pulmonary effort is normal.   Musculoskeletal:         General: Normal range of motion.      Cervical back: Neck supple.   Skin:     General: Skin is warm.   Neurological:      General: No focal deficit present.      Mental Status: He is alert.   Psychiatric:         Mood and Affect: Mood normal.          Pertinent  and/or Most Recent Results     LAB RESULTS:      Lab 09/14/23  0415 09/13/23  1510   WBC 7.30 7.50   HEMOGLOBIN 14.3 14.7   HEMATOCRIT 42.7 43.9   PLATELETS 190 219   NEUTROS ABS 4.80 5.10   LYMPHS ABS 1.60 1.40   MONOS ABS 0.70 0.70   EOS ABS 0.20 0.10   MCV 90.9 89.9         Lab 09/14/23  0755 09/14/23  0415 09/13/23  1510   SODIUM  --  138 140   POTASSIUM  --  4.5 4.0   CHLORIDE  --  105 107   CO2  --  25.0 22.0   ANION GAP  --  8.0 11.0   BUN  --  14 17   CREATININE  --  0.94 1.09   EGFR  --  95.1 79.7   GLUCOSE  --  107* 101*   CALCIUM  --  8.7 8.9   MAGNESIUM 2.1  --   --    TSH 2.310  --   --          Lab 09/13/23  1510   TOTAL PROTEIN 6.1   ALBUMIN 4.0   GLOBULIN 2.1   ALT (SGPT) 22   AST (SGOT) 17   BILIRUBIN 0.4   ALK PHOS 81         Lab 09/13/23  1510   PROBNP 1,230.0*   HSTROP T  8                 Brief Urine Lab Results       None          Microbiology Results (last 10 days)       ** No results found for the last 240 hours. **            XR Chest 1 View    Result Date: 9/13/2023  Impression: Impression: No evidence of acute cardiopulmonary disease. Electronically Signed: Flip Springer MD  9/13/2023 3:38 PM EDT  Workstation ID: [id number]             Results for orders placed during the hospital encounter of 06/26/23    Adult Transthoracic Echo Complete W/ Cont if Necessary Per Protocol    Interpretation Summary    Left ventricular ejection fraction appears to be 56 - 60%.    Indications  Arrhythmia    Technically satisfactory study.  Mitral valve is structurally normal.  Tricuspid valve is structurally normal.  Aortic valve is structurally normal.  Pulmonic valve could not be well visualized.  No evidence for mitral tricuspid or aortic regurgitation is seen by Doppler study.  Left atrium is normal in size.  Right atrium is normal in size.  Left ventricle is normal in size and proximal inferior and posterior wall hypokinesis.  Right ventricle is normal in size.  Atrial septum is intact.  Aorta is normal.  No pericardial effusion or intracardiac thrombus is seen.    Impression  Structurally and functionally normal cardiac valves.  Left ventricular size and proximal posterior and inferior wall hypokinesis with ejection fraction of 60 %.      Labs Pending at Discharge:      Procedures Performed  Procedure(s):  AFib/Flutter Ablation         Consults:   Consults       Date and Time Order Name Status Description    9/13/2023  6:21 PM Hospitalist (on-call MD unless specified)      9/13/2023  5:31 PM Cardiology (on-call MD unless specified)                Discharge Details        Discharge Medications        New Medications        Instructions Start Date   flecainide 100 MG tablet  Commonly known as: TAMBOCOR   100 mg, Oral, Every 12 Hours Scheduled             Continue These Medications         Instructions Start Date   acetaminophen 325 MG tablet  Commonly known as: TYLENOL   650 mg, Oral, Every 4 Hours PRN      apixaban 5 MG tablet tablet  Commonly known as: ELIQUIS   5 mg, Oral, 2 Times Daily      atorvastatin 40 MG tablet  Commonly known as: LIPITOR   40 mg, Oral, Daily      dilTIAZem  MG 24 hr capsule  Commonly known as: CARDIZEM CD   240 mg, Oral, Daily      metoprolol succinate XL 25 MG 24 hr tablet  Commonly known as: TOPROL-XL   50 mg, Oral, Every 12 Hours Scheduled      nitroglycerin 0.4 MG SL tablet  Commonly known as: NITROSTAT   0.4 mg, Sublingual, Every 5 Minutes PRN, Take no more than 3 doses in 15 minutes.               Allergies   Allergen Reactions    Iodine Irritability     Topical irritation         Discharge Disposition:   Home or Self Care    Diet:  Hospital:  Diet Order   Procedures    Diet: Regular/House Diet; Texture: Regular Texture (IDDSI 7); Fluid Consistency: Thin (IDDSI 0)         Discharge Activity: Ad pam.        CODE STATUS:  Code Status and Medical Interventions:   Ordered at: 09/13/23 1934     Level Of Support Discussed With:    Patient     Code Status (Patient has no pulse and is not breathing):    CPR (Attempt to Resuscitate)     Medical Interventions (Patient has pulse or is breathing):    Full Support         No future appointments.        Time spent on Discharge including face to face service:    > 30 minutes    This patient has been  and discussed with . 09/15/23      Signature:

## 2023-09-15 NOTE — PLAN OF CARE
Goal Outcome Evaluation: Patient stable. HR maintains in 60s all morning. Discharging today.

## 2023-09-15 NOTE — PROGRESS NOTES
Referring Provider: Farhat Jacob MD    Reason for follow-up:  Atrial flutter with RVR     Patient Care Team:  Provider, No Known as PCP - General    Subjective .      ROS  Doing better.    Since I have last seen, the patient has been without any chest discomfort ,shortness of breath, palpitations, dizziness or syncope.  Denies having any headache ,abdominal pain ,nausea, vomiting , diarrhea constipation, loss of weight or loss of appetite.  Denies having any excessive bruising ,hematuria or blood in the stool.    Review of all systems negative except as indicated    History  Past Medical History:   Diagnosis Date    Abnormal ECG 6/27/23    Arrhythmia 3/13/2021    Atrial fibrillation     HLD (hyperlipidemia)     Hypertension        Past Surgical History:   Procedure Laterality Date    CARDIAC CATHETERIZATION N/A 6/28/2023    Procedure: Left Heart Cath and coronary angiogram;  Surgeon: Greg Jade MD;  Location: [facility] CATH INVASIVE LOC[facility];  Service: Cardiovascular;  Laterality: N/A;    KNEE ARTHROPLASTY         Family History   Problem Relation Age of Onset    Heart disease Mother     Heart disease Father     Heart attack Father        Social History     Tobacco Use    Smoking status: Never     Passive exposure: Past (AS A CHILD)    Smokeless tobacco: Never   Vaping Use    Vaping Use: Never used   Substance Use Topics    Alcohol use: Yes     Alcohol/week: 12.0 standard drinks     Types: 12 Cans of beer per week     Comment: Beer on occasion    Drug use: Never        Medications Prior to Admission   Medication Sig Dispense Refill Last Dose    acetaminophen (TYLENOL) 325 MG tablet Take 2 tablets by mouth Every 4 (Four) Hours As Needed for Mild Pain (temperature greater than 101F) for up to 90 days. 90 tablet 2     apixaban (ELIQUIS) 5 MG tablet tablet Take 1 tablet by mouth 2 (Two) Times a Day. 180 tablet 4     atorvastatin (LIPITOR) 40 MG tablet Take 1 tablet by mouth Daily.       dilTIAZem CD  "(CARDIZEM CD) 240 MG 24 hr capsule Take 1 capsule by mouth Daily. 90 capsule 3     metoprolol succinate XL (TOPROL-XL) 25 MG 24 hr tablet Take 2 tablets by mouth Every 12 (Twelve) Hours for 90 days. 180 tablet 3     nitroglycerin (NITROSTAT) 0.4 MG SL tablet Place 1 tablet under the tongue Every 5 (Five) Minutes As Needed for Chest Pain (Only if SBP Greater Than 100) for up to 90 days. Take no more than 3 doses in 15 minutes. 30 tablet 12          Allergies  Iodine    Scheduled Meds:apixaban, 5 mg, Oral, BID  atorvastatin, 40 mg, Oral, Daily  flecainide, 100 mg, Oral, Q12H  metoprolol succinate XL, 50 mg, Oral, Q24H  senna-docusate sodium, 2 tablet, Oral, BID  sodium chloride, 10 mL, Intravenous, Q12H      Continuous Infusions:     PRN Meds:.  acetaminophen    senna-docusate sodium **AND** polyethylene glycol **AND** bisacodyl **AND** bisacodyl    meperidine    naloxone    nitroglycerin    nitroglycerin    ondansetron    sodium chloride    sodium chloride    sodium chloride    Objective     VITAL SIGNS  Vitals:    09/14/23 1930 09/14/23 2200 09/15/23 0131 09/15/23 0507   BP: 126/85 122/80 112/65 118/80   BP Location: Left arm Left arm Left arm Left arm   Patient Position: Lying Lying Lying Lying   Pulse: 68 83 81 77   Resp: 14 17 17 15   Temp: 98 °F (36.7 °C) 98 °F (36.7 °C) 98 °F (36.7 °C) 98 °F (36.7 °C)   TempSrc: Oral Oral Oral Oral   SpO2: 95% 95% 93% 93%   Weight:    117 kg (257 lb 15 oz)   Height:           Flowsheet Rows      Flowsheet Row First Filed Value   Admission Height 190.5 cm (75\") Documented at 09/13/2023 1438   Admission Weight 122 kg (268 lb 15.4 oz) Documented at 09/13/2023 1438              Intake/Output Summary (Last 24 hours) at 9/15/2023 0651  Last data filed at 9/14/2023 2200  Gross per 24 hour   Intake 880 ml   Output 1675 ml   Net -795 ml        TELEMETRY: Sinus rhythm.    Physical Exam:  The patient is alert, oriented and in no distress.  Vital signs as noted above.  Head and neck " revealed no carotid bruits or jugular venous distention.  No thyromegaly or lymphadenopathy is present  Lungs clear.  No wheezing.  Breath sounds are normal bilaterally.  Heart normal first and second heart sounds.  No murmur. No precordial rub is present.  No gallop is present.  Abdomen soft and nontender.  No organomegaly is present.  Extremities with good peripheral pulses without any pedal edema.  Right groin site looks normal.  Skin warm and dry.  Musculoskeletal system is grossly normal  CNS grossly normal    Reviewed and updated.  Results Review:   I reviewed the patient's new clinical results.  Lab Results (last 24 hours)       Procedure Component Value Units Date/Time    TSH [[id number]]  (Normal) Collected: 09/14/23 0755    Specimen: Blood Updated: 09/14/23 0838     TSH 2.310 uIU/mL     Magnesium [[id number]]  (Normal) Collected: 09/14/23 0755    Specimen: Blood Updated: 09/14/23 0820     Magnesium 2.1 mg/dL             Imaging Results (Last 24 Hours)       ** No results found for the last 24 hours. **        LAB RESULTS (LAST 7 DAYS)    CBC  Results from last 7 days   Lab Units 09/14/23 0415 09/13/23  1510   WBC 10*3/mm3 7.30 7.50   RBC 10*6/mm3 4.69 4.88   HEMOGLOBIN g/dL 14.3 14.7   HEMATOCRIT % 42.7 43.9   MCV fL 90.9 89.9   PLATELETS 10*3/mm3 190 219         BMP  Results from last 7 days   Lab Units 09/14/23 0755 09/14/23 0415 09/13/23  1510   SODIUM mmol/L  --  138 140   POTASSIUM mmol/L  --  4.5 4.0   CHLORIDE mmol/L  --  105 107   CO2 mmol/L  --  25.0 22.0   BUN mg/dL  --  14 17   CREATININE mg/dL  --  0.94 1.09   GLUCOSE mg/dL  --  107* 101*   MAGNESIUM mg/dL 2.1  --   --          CMP   Results from last 7 days   Lab Units 09/14/23 0415 09/13/23  1510   SODIUM mmol/L 138 140   POTASSIUM mmol/L 4.5 4.0   CHLORIDE mmol/L 105 107   CO2 mmol/L 25.0 22.0   BUN mg/dL 14 17   CREATININE mg/dL 0.94 1.09   GLUCOSE mg/dL 107* 101*   ALBUMIN g/dL  --  4.0   BILIRUBIN mg/dL  --  0.4   ALK PHOS U/L  --  81    AST (SGOT) U/L  --  17   ALT (SGPT) U/L  --  22           BNP        TROPONIN  Results from last 7 days   Lab Units 09/13/23  1510   HSTROP T ng/L 8         CoAg        Creatinine Clearance  Estimated Creatinine Clearance: 121 mL/min (by C-G formula based on SCr of 0.94 mg/dL).    ABG        Radiology  XR Chest 1 View    Result Date: 9/13/2023  Impression: No evidence of acute cardiopulmonary disease. Electronically Signed: Flip Springer MD  9/13/2023 3:38 PM EDT  Workstation ID: [id number]         EKG      I personally viewed and interpreted the patient's EKG/Telemetry data: Atrial flutter and subsequently in sinus rhythm.    ECHOCARDIOGRAM:    Results for orders placed during the hospital encounter of 06/26/23    Adult Transthoracic Echo Complete W/ Cont if Necessary Per Protocol    Interpretation Summary    Left ventricular ejection fraction appears to be 56 - 60%.    Indications  Arrhythmia    Technically satisfactory study.  Mitral valve is structurally normal.  Tricuspid valve is structurally normal.  Aortic valve is structurally normal.  Pulmonic valve could not be well visualized.  No evidence for mitral tricuspid or aortic regurgitation is seen by Doppler study.  Left atrium is normal in size.  Right atrium is normal in size.  Left ventricle is normal in size and proximal inferior and posterior wall hypokinesis.  Right ventricle is normal in size.  Atrial septum is intact.  Aorta is normal.  No pericardial effusion or intracardiac thrombus is seen.    Impression  Structurally and functionally normal cardiac valves.  Left ventricular size and proximal posterior and inferior wall hypokinesis with ejection fraction of 60 %.          STRESS TEST  Results for orders placed during the hospital encounter of 06/26/23    Stress Test With Myocardial Perfusion One Day    Interpretation Summary  Indications  Palpitations    This study was performed under the direct supervision of Hanna GARCIA.    Resting ECG  Sinus  rhythm    The patient was injected with Lexiscan intravenously while constantly monitoring electrocardiogram and vital signs.  Patient did not have any chest discomfort ST abnormalities or ectopy with injection of Lexiscan.    Cardiolite was used as an imaging agent.    Cardiolite images showed decreased radionuclide uptake in the posterior lateral segments with partial reperfusion.    Gated SPECT images revealed normal left ventricle size and contractility with ejection fraction of 56%.    Impression  ========  Lexiscan Cardiolite test is abnormal with posterolateral infarction and ischemia.    Gated SPECT images revealed normal left ventricular size and contractility with ejection fraction of 56%.        Cardiolite (Tc-99m sestamibi) stress test    CARDIAC CATHETERIZATION  Results for orders placed during the hospital encounter of 06/26/23    Cardiac Catheterization/Vascular Study    Narrative  CARDIAC CATHETERIZATION REPORT    DATE OF PROCEDURE:  6/28/2023    INDICATION FOR PROCEDURE:  Shortness of breath  A-fib with RVR  Abnormal stress Cardiolite test    PROCEDURE PERFORMED:  Left heart catheterization, coronary angiography, left ventriculography    @@  Moderate conscious sedation was utilized with intravenous Versed and fentanyl administered by registered nurse with continuous ECG, pulse oximetry and hemodynamic monitoring supervised by myself throughout the entire procedure.  Conscious sedation time   30 minutes    I was present with the patient for the duration of moderate sedation and supervised staff who had no other duties and monitored the patient for the entire procedure.    @@  PROCEDURE COMMENTS:    Under usual sterile precautions and 1% Xylocaine infiltration right femoral artery was percutaneously punctured and a 5 Malay vascular sheath was introduced into the right femoral artery.  Left and right coronary arteriography was performed in varying degrees of obliquity followed by left ventricular  angiogram.  Patient has tortuosity of the right common iliac artery and all catheters were exchanged using exchange guidewire.  Hemostasis was obtained and patient was returned to the room with intact pulses.  No complications were noted.    FINDINGS:    1. HEMODYNAMICS:  Left ventricle end-diastolic pressure is normal.  No gradient was noted across the aortic valve.    2. LEFT VENTRICULOGRAPHY:  Left ventricular size and contractility is normal with ejection fraction of 60%.  No mitral regurgitation is present.    3. CORONARY ANGIOGRAPHY:  Left main coronary artery is normal.  Left anterior descending artery is normal.  Circumflex coronary artery is normal.  Right coronary artery is a large and dominant vessel and is normal.    SUMMARY:  Normal left ventricular size and contractility with ejection fraction of 60%.  Normal epicardial coronary arteries.    RECOMMENDATIONS:  Noncardiac work-up.  Continue aspirin and beta-blocker for atrial fibrillation.  Anticoagulation versus 30-day event monitor to assess A-fib burden and consider anticoagulation.                OTHER:         Assessment & Plan     Principal Problem:    Atrial flutter  Active Problems:    Atrial fibrillation with RVR    Essential hypertension      ]]]]]]]]]]]]]]]]]]]]  History  =============  - Patient has atrial flutter with RVR.  Status post ablation for atrial flutter 9/14/2023-Dr. Mcdowell  A-fib ablation was not performed due to lack of lab time    - History of atrial fibrillation with RVR.  Patient has increasing frequency of palpitations.  Patient had work-up in [place] approximately 2 years ago.  Patient was maintaining sinus rhythm.  Patient is now having paroxysmal atrial fibrillation.  Patient did not have ablation in the past.     - Shortness of breath     - Mild elevation of troponin at 180-flat (high-sensitivity troponin)  EKG showed no acute changes     Echocardiogram-6/27/2023 showed proximal posterior and inferior  hypokinesis.  Lexiscan Cardiolite test is abnormal with proximal posterior wall ischemia.  6/27/2023     Cardiac cath 6/28/2023 revealed normal left ventricle function and normal coronary arteries.     - Hypertension dyslipidemia     - Exogenous obesity     Status post bilateral knee replacements     - Non-smoker     - Occasional beer drinking     - Allergic to iodine  =============  Plan  ==============  Atrial flutter with RVR.  Patient was not responding to medications as outpatient.  Patient was on metoprolol and p.o. Cardizem.  Patient did not respond to intravenous Cardizem.  Patient was started on intravenous amiodarone.  Patient did not convert to sinus rhythm.  Patient had ablation for atrial flutter 9/14/2023.  Patient is maintaining sinus rhythm.    Patient was taken off Cardizem.  Patient is off intravenous amiodarone.  Started Tambocor by Dr. Mcdowell.  Continue metoprolol XL 50 mg twice daily    Past history of history of A-fib with RVR.  Patient has converted to sinus rhythm.    TSH is normal at 2.3  Magnesium level 2.1.    Hypertension-124/87     Dyslipidemia-continue atorvastatin    Anticoagulation-patient is on Eliquis.  Observe for toxic effects.    Medications were reviewed and updated.  Current medications include Eliquis atorvastatin flecainide (Tambocor) 100 mg every 12 hours metoprolol XL 50 mg twice daily    Have discussed with patient about present treatment medications expectations activities limitations etc.    Okay with discharge plans.    Follow-up in the office in 2 weeks.    Have discussed with attending nurse for coordination of care.    Further plan will depend on patient's progress.    Reviewed and updated-9/15/2023  ]]]]]]]]]]]]]]]]]]]]]]              Greg Jade MD  09/15/23  06:51 EDT

## 2023-09-15 NOTE — PLAN OF CARE
Goal Outcome Evaluation:  Plan of Care Reviewed With: patient        Progress: improving          Patient is status post atrial flutter ablation Thursday evening. Patient has a right groin site that has remained clean dry and intact throughout this shift free from signs or symptoms of hematoma while also remaining neurovascularly intact in RLE. Figure 8 suture removed per md orders without incident. Patient has since been out of bed without incident. Patient started antiarrhythmic medication per post procedure orders without incident. Patient had questions regarding atrial fibrillation and atrial flutter, questions answered to the best of this nurses ability and handouts with information given to patient regarding both as well as patient's new antiarrhythmic medication. Vital signs have remained within normal limits throughout this shift post procedure. No complaints from patient.

## 2023-09-15 NOTE — CASE MANAGEMENT/SOCIAL WORK
Case Management Discharge Note      Final Note: Home         Selected Continued Care - Admitted Since 9/13/2023          Transportation Services  Private: Car    Final Discharge Disposition Code: 01 - home or self-care

## 2023-09-22 NOTE — PAYOR COMM NOTE
"DISCHARGE NOTIFICATION FOR CASE# [id number]     =============  THANK YOU,    MAUREEN Guallpa, RN  Utilization Review  [facility]  Phone: [phone]  Fax: [phone]      NPI: [id number]  Tax ID: [id number]          Les Pike (58 y.o. Male)       Date of Birth   1965    Social Security Number       Address   [address] [place] [address]    Home Phone   [phone]    MRN   [id number]       Hindu   None    Marital Status                               Admission Date   23    Admission Type   Emergency    Admitting Provider       Attending Provider       Department, Room/Bed   [facility],        Discharge Date   9/15/2023    Discharge Disposition   Home or Self Care    Discharge Destination                                 Attending Provider: (none)   Allergies: Iodine    Isolation: None   Infection: None   Code Status: Prior    Ht: 190.5 cm (75\")   Wt: 117 kg (257 lb 15 oz)    Admission Cmt: None   Principal Problem: Atrial flutter [I48.92]                   Active Insurance as of 2023       Primary Coverage       Payor Plan Insurance Group Employer/Plan Group    [employer]HEM BLUE CROSS ANTHEM BLUE CROSS BLUE SHIELD PPO [id number]       Payor Plan Address Payor Plan Phone Number Payor Plan Fax Number Effective Dates    [address] [phone]  2021 - None Entered    [place] [address]         Subscriber Name Subscriber Birth Date Member ID       LES PIKE 1965 [id number]                     Emergency Contacts        (Rel.) Home Phone Work Phone Mobile Phone    NICHO PIKE (Spouse) -- -- [phone]                 Discharge Summary        Juan-Farhat Gates MD at 09/15/23 1248                       [facility] Medicine Services  DISCHARGE SUMMARY    Patient Name: Les Pike  : 1965  MRN: [id number]    Date of Admission: 2023  Discharge " Diagnosis:  1.  Atrial flutter with RVR, status post ablation (9/14)  2.  Hypertension  3.  Obesity, BMI of 32  4.  Hyperlipidemia    Date of Discharge:    9/15/2023  Primary Care Physician: Provider, No Known      Presenting Problem:   Atrial flutter [I48.92]  Palpitations [R00.2]  Atrial fibrillation with RVR [I48.91]  Typical atrial flutter [I48.3]    Active and Resolved Hospital Problems:  Active Hospital Problems    Diagnosis POA    **Atrial flutter [I48.92] Yes    Atrial fibrillation with RVR [I48.91] Yes    Essential hypertension [I10] Yes      Resolved Hospital Problems   No resolved problems to display.         Hospital Course     Brief HPI/Hospital course: 58-year-old pleasant, obese male with BMI of 32, hypertension and A- fibrillation/flutter .  Patient was admitted on 9/13/2023, presented to [facility] ED complaining of palpitation and shortness of breath for the past few days.  He went to see his cardiologist, Dr. Jade due to symptomatology and was advised to present to the emergency room for further evaluation.  He denies fever, chills, no adrianna chest pain, dizziness or lightheadedness.  No abdominal pain, nausea or vomiting.  High sensitive troponin of 8 and proBNP of 1231.  EKG showed atrial flutter with RVR.  Patient was admitted with a principal diagnosis of atrial flutter with associated palpitation and shortness of breath.  He was seen in consultation by cardiologist and underwent ablation resumption of sinus rhythm and rate control.  He reported feeling much better, up and ambulating in the room without any dizziness, lightheadedness chest discomfort.  His brief hospital course has been relatively uneventful.  Cardiologist recommended continue with cardiac meds including audiologist recommended that he continue with flecainide, Toprol-XL and anticoagulation with Eliquis.    DISCHARGE Follow Up Recommendations for labs and diagnostics:   1.  Follow-up with PCP and cardiology as scheduled    Reasons  For Change In Medications and Indications for New Medications:      Day of Discharge     Vital Signs:  Temp:  [97.9 °F (36.6 °C)-98.2 °F (36.8 °C)] 98.2 °F (36.8 °C)  Heart Rate:  [] 73  Resp:  [10-22] 22  BP: (110-142)/() 123/72    Physical Exam  Constitutional:       General: He is not in acute distress.     Appearance: He is obese. He is not ill-appearing.   HENT:      Head: Normocephalic.      Right Ear: Tympanic membrane normal.      Nose: Nose normal.      Mouth/Throat:      Mouth: Mucous membranes are moist.   Cardiovascular:      Rate and Rhythm: Normal rate.      Pulses: Normal pulses.   Pulmonary:      Effort: Pulmonary effort is normal.   Musculoskeletal:         General: Normal range of motion.      Cervical back: Neck supple.   Skin:     General: Skin is warm.   Neurological:      General: No focal deficit present.      Mental Status: He is alert.   Psychiatric:         Mood and Affect: Mood normal.          Pertinent  and/or Most Recent Results     LAB RESULTS:      Lab 09/14/23  0415 09/13/23  1510   WBC 7.30 7.50   HEMOGLOBIN 14.3 14.7   HEMATOCRIT 42.7 43.9   PLATELETS 190 219   NEUTROS ABS 4.80 5.10   LYMPHS ABS 1.60 1.40   MONOS ABS 0.70 0.70   EOS ABS 0.20 0.10   MCV 90.9 89.9         Lab 09/14/23  0755 09/14/23  0415 09/13/23  1510   SODIUM  --  138 140   POTASSIUM  --  4.5 4.0   CHLORIDE  --  105 107   CO2  --  25.0 22.0   ANION GAP  --  8.0 11.0   BUN  --  14 17   CREATININE  --  0.94 1.09   EGFR  --  95.1 79.7   GLUCOSE  --  107* 101*   CALCIUM  --  8.7 8.9   MAGNESIUM 2.1  --   --    TSH 2.310  --   --          Lab 09/13/23  1510   TOTAL PROTEIN 6.1   ALBUMIN 4.0   GLOBULIN 2.1   ALT (SGPT) 22   AST (SGOT) 17   BILIRUBIN 0.4   ALK PHOS 81         Lab 09/13/23  1510   PROBNP 1,230.0*   HSTROP T 8                 Brief Urine Lab Results       None          Microbiology Results (last 10 days)       ** No results found for the last 240 hours. **            XR Chest 1 View    Result  Date: 9/13/2023  Impression: Impression: No evidence of acute cardiopulmonary disease. Electronically Signed: Flip Springer MD  9/13/2023 3:38 PM EDT  Workstation ID: [id number]             Results for orders placed during the hospital encounter of 06/26/23    Adult Transthoracic Echo Complete W/ Cont if Necessary Per Protocol    Interpretation Summary    Left ventricular ejection fraction appears to be 56 - 60%.    Indications  Arrhythmia    Technically satisfactory study.  Mitral valve is structurally normal.  Tricuspid valve is structurally normal.  Aortic valve is structurally normal.  Pulmonic valve could not be well visualized.  No evidence for mitral tricuspid or aortic regurgitation is seen by Doppler study.  Left atrium is normal in size.  Right atrium is normal in size.  Left ventricle is normal in size and proximal inferior and posterior wall hypokinesis.  Right ventricle is normal in size.  Atrial septum is intact.  Aorta is normal.  No pericardial effusion or intracardiac thrombus is seen.    Impression  Structurally and functionally normal cardiac valves.  Left ventricular size and proximal posterior and inferior wall hypokinesis with ejection fraction of 60 %.      Labs Pending at Discharge:      Procedures Performed  Procedure(s):  AFib/Flutter Ablation         Consults:   Consults       Date and Time Order Name Status Description    9/13/2023  6:21 PM Hospitalist (on-call MD unless specified)      9/13/2023  5:31 PM Cardiology (on-call MD unless specified)                Discharge Details        Discharge Medications        New Medications        Instructions Start Date   flecainide 100 MG tablet  Commonly known as: TAMBOCOR   100 mg, Oral, Every 12 Hours Scheduled             Continue These Medications        Instructions Start Date   acetaminophen 325 MG tablet  Commonly known as: TYLENOL   650 mg, Oral, Every 4 Hours PRN      apixaban 5 MG tablet tablet  Commonly known as: ELIQUIS   5 mg, Oral, 2  Times Daily      atorvastatin 40 MG tablet  Commonly known as: LIPITOR   40 mg, Oral, Daily      dilTIAZem  MG 24 hr capsule  Commonly known as: CARDIZEM CD   240 mg, Oral, Daily      metoprolol succinate XL 25 MG 24 hr tablet  Commonly known as: TOPROL-XL   50 mg, Oral, Every 12 Hours Scheduled      nitroglycerin 0.4 MG SL tablet  Commonly known as: NITROSTAT   0.4 mg, Sublingual, Every 5 Minutes PRN, Take no more than 3 doses in 15 minutes.               Allergies   Allergen Reactions    Iodine Irritability     Topical irritation         Discharge Disposition:   Home or Self Care    Diet:  Hospital:  Diet Order   Procedures    Diet: Regular/House Diet; Texture: Regular Texture (IDDSI 7); Fluid Consistency: Thin (IDDSI 0)         Discharge Activity: Ad pam.        CODE STATUS:  Code Status and Medical Interventions:   Ordered at: 09/13/23 1934     Level Of Support Discussed With:    Patient     Code Status (Patient has no pulse and is not breathing):    CPR (Attempt to Resuscitate)     Medical Interventions (Patient has pulse or is breathing):    Full Support         No future appointments.        Time spent on Discharge including face to face service:    > 30 minutes    This patient has been  and discussed with . 09/15/23      Signature:       Electronically signed by Farhat Lamar MD at 09/15/23 1257       Discharge Order (From admission, onward)       Start     Ordered    09/15/23 1248  Discharge patient  Once        Expected Discharge Date: 09/15/23   Discharge Disposition: Home or Self Care   Physician of Record for Attribution - Please select from Treatment Team: FARHAT LAMAR [[id number]]   Review needed by CMO to determine Physician of Record: No      Question Answer Comment   Physician of Record for Attribution - Please select from Treatment Team FARHAT LAMAR    Review needed by CMO to determine Physician of Record No        09/15/23 1248

## 2023-09-23 LAB
QT INTERVAL: 427 MS
QTC INTERVAL: 482 MS

## 2023-09-28 ENCOUNTER — OFFICE VISIT (OUTPATIENT)
Dept: CARDIOLOGY | Facility: CLINIC | Age: 58
End: 2023-09-28
Payer: COMMERCIAL

## 2023-09-28 VITALS
HEIGHT: 75 IN | WEIGHT: 267 LBS | OXYGEN SATURATION: 99 % | HEART RATE: 48 BPM | BODY MASS INDEX: 33.2 KG/M2 | SYSTOLIC BLOOD PRESSURE: 164 MMHG | DIASTOLIC BLOOD PRESSURE: 98 MMHG

## 2023-09-28 DIAGNOSIS — E66.9 OBESITY (BMI 30-39.9): ICD-10-CM

## 2023-09-28 DIAGNOSIS — I10 ESSENTIAL HYPERTENSION: Primary | ICD-10-CM

## 2023-09-28 DIAGNOSIS — Z79.01 CHRONIC ANTICOAGULATION: ICD-10-CM

## 2023-09-28 DIAGNOSIS — R79.89 ELEVATED TROPONIN: ICD-10-CM

## 2023-09-28 DIAGNOSIS — I48.92 ATRIAL FLUTTER WITH RAPID VENTRICULAR RESPONSE: ICD-10-CM

## 2023-09-28 RX ORDER — FLECAINIDE ACETATE 100 MG/1
100 TABLET ORAL EVERY 12 HOURS SCHEDULED
Qty: 180 TABLET | Refills: 3 | Status: SHIPPED | OUTPATIENT
Start: 2023-09-28

## 2023-09-28 RX ORDER — AMLODIPINE BESYLATE 5 MG/1
5 TABLET ORAL DAILY
Qty: 90 TABLET | Refills: 3 | Status: SHIPPED | OUTPATIENT
Start: 2023-09-28

## 2023-09-28 NOTE — PROGRESS NOTES
Encounter Date:09/28/2023  Last seen 9/15/2023      Patient ID: Nav Choudhary is a 58 y.o. male.    Chief Complaint:  Atrial flutter  Anticoagulation management  Shortness of breath  Hypertension  Dyslipidemia      History of Present Illness  Patient recently was admitted to [facility] with atrial flutter with rapid ventricular response.  Patient was persistent with atrial flutter despite being on IV Cardizem and amiodarone.  Patient had ablation for atrial flutter.  A-fib ablation was not performed due to lack of lab time.  Patient was discharged home.  Patient has occasional palpitations.    Overall, he is feeling well.  Since I have last seen, the patient has been without any chest discomfort ,shortness of breath,, dizziness or syncope.  Denies having any headache ,abdominal pain ,nausea, vomiting , diarrhea constipation, loss of weight or loss of appetite.  Denies having any excessive bruising ,hematuria or blood in the stool.    Review of all systems negative except as indicated.    Reviewed ROS.        Assessment and Plan       ]]]]]]]]]]]]]]]]]]]]  History  =============  - Patient has atrial flutter with RVR.  Status post ablation for atrial flutter 9/14/2023-Dr. Mcdowell  A-fib ablation was not performed due to lack of lab time.  EKG 9/28/2023-sinus bradycardia     - History of atrial fibrillation with RVR.  Patient has increasing frequency of palpitations.  Patient had work-up in [place] approximately 2 years ago.  Patient was maintaining sinus rhythm.  Patient is now having paroxysmal atrial fibrillation.  Patient did not have ablation in the past.     - Shortness of breath-improved     - Mild elevation of troponin at 180-flat (high-sensitivity troponin)  EKG showed no acute changes     Echocardiogram-6/27/2023 showed proximal posterior and inferior hypokinesis.  Lexiscan Cardiolite test is abnormal with proximal posterior wall ischemia.  6/27/2023     Cardiac cath 6/28/2023 revealed  normal left ventricle function and normal coronary arteries.     - Hypertension dyslipidemia     - Exogenous obesity     Status post bilateral knee replacements     - Non-smoker     - Occasional beer drinking     - Allergic to iodine  =============  Plan  ==============  Atrial flutter with RVR.  Patient was not responding to medications as outpatient.  Patient was on metoprolol and p.o. Cardizem.  Patient did not respond to intravenous Cardizem.  Patient was started on intravenous amiodarone.  Patient did not convert to sinus rhythm.  Patient had ablation for atrial flutter 9/14/2023.  Patient is maintaining sinus rhythm.  EKG-sinus rhythm-9/28/2023    Tambocor 100 mg twice daily by Dr. Mcdowell.  Continue metoprolol XL 50 mg twice daily    Hypertension-not well controlled.  Started on amlodipine 5 mg p.o. every morning.  Patient is on metoprolol XL (25 mg tablet)-50 mg twice daily.    Lipidemia-on atorvastatin      TSH is normal at 2.3  Magnesium level 2.1.     Anticoagulation-patient is on Eliquis.  Observe for toxic effects.     Medications were reviewed and updated.  Current medications include Eliquis atorvastatin flecainide (Tambocor) 100 mg every 12 hours metoprolol XL 50 mg twice daily.  Patient was started on Norvasc 5 mg a day.    Follow-up in the office in 6 weeks with EKG.  Further plan will depend on patient's progress.     Reviewed and updated-9/28/2023  ]]]]]]]]]]]]]]]]]]]]]]         Diagnosis Plan   1. Essential hypertension        2. Obesity (BMI 30-39.9)        3. Atrial flutter with rapid ventricular response        4. Chronic anticoagulation        5. Elevated troponin        LAB RESULTS (LAST 7 DAYS)    CBC        BMP        CMP         BNP        TROPONIN        CoAg        Creatinine Clearance  Estimated Creatinine Clearance: 123 mL/min (by C-G formula based on SCr of 0.94 mg/dL).    ABG        Radiology  No radiology results for the last day                The following portions of the  patient's history were reviewed and updated as appropriate: allergies, current medications, past family history, past medical history, past social history, past surgical history, and problem list.    ROS      Current Outpatient Medications:   •  apixaban (ELIQUIS) 5 MG tablet tablet, Take 1 tablet by mouth 2 (Two) Times a Day., Disp: 180 tablet, Rfl: 4  •  atorvastatin (LIPITOR) 40 MG tablet, Take 1 tablet by mouth Daily., Disp: , Rfl:   •  dilTIAZem CD (CARDIZEM CD) 240 MG 24 hr capsule, Take 1 capsule by mouth Daily., Disp: 90 capsule, Rfl: 3  •  flecainide (TAMBOCOR) 100 MG tablet, Take 1 tablet by mouth Every 12 (Twelve) Hours., Disp: 30 tablet, Rfl: 0  •  metoprolol succinate XL (TOPROL-XL) 25 MG 24 hr tablet, Take 2 tablets by mouth Every 12 (Twelve) Hours for 90 days., Disp: 180 tablet, Rfl: 3  •  nitroglycerin (NITROSTAT) 0.4 MG SL tablet, Place 1 tablet under the tongue Every 5 (Five) Minutes As Needed for Chest Pain (Only if SBP Greater Than 100) for up to 90 days. Take no more than 3 doses in 15 minutes., Disp: 30 tablet, Rfl: 12    Allergies   Allergen Reactions   • Iodine Irritability     Topical irritation       Family History   Problem Relation Age of Onset   • Heart disease Mother    • Heart disease Father    • Heart attack Father        Past Surgical History:   Procedure Laterality Date   • CARDIAC CATHETERIZATION N/A 6/28/2023    Procedure: Left Heart Cath and coronary angiogram;  Surgeon: Greg Jade MD;  Location: [facility] CATH INVASIVE LOCATION;  Service: Cardiovascular;  Laterality: N/A;   • CARDIAC ELECTROPHYSIOLOGY PROCEDURE N/A 9/14/2023    Procedure: AFib/Flutter Ablation;  Surgeon: Andrade Mcdowell MD;  Location: [facility] CATH INVASIVE LOCATION;  Service: Cardiovascular;  Laterality: N/A;   • KNEE ARTHROPLASTY         Past Medical History:   Diagnosis Date   • Abnormal ECG 6/27/23   • Arrhythmia 3/13/2021   • Atrial fibrillation    • HLD (hyperlipidemia)    • Hypertension        Family  "History   Problem Relation Age of Onset   • Heart disease Mother    • Heart disease Father    • Heart attack Father        Social History     Socioeconomic History   • Marital status:    Tobacco Use   • Smoking status: Never     Passive exposure: Past (AS A CHILD)   • Smokeless tobacco: Never   Vaping Use   • Vaping Use: Never used   Substance and Sexual Activity   • Alcohol use: Yes     Alcohol/week: 12.0 standard drinks     Types: 12 Cans of beer per week     Comment: Beer on occasion   • Drug use: Never   • Sexual activity: Yes     Partners: Female     Birth control/protection: None           ECG 12 Lead    Date/Time: 9/28/2023 2:58 PM  Performed by: Greg Jade MD  Authorized by: Greg Jade MD   Comparison: compared with previous ECG   Similar to previous ECG  Comparison to previous ECG: Sinus bradycardia 48/min QTc interval 460 ms right axis deviation no ectopy no significant change from 9/15/2023.        Objective:       Physical Exam    /98 (BP Location: Left arm, Patient Position: Sitting, Cuff Size: Large Adult)   Pulse (!) 48 Comment: EKG READING  Ht 190.5 cm (75\")   Wt 121 kg (267 lb)   SpO2 99% Comment: RA  BMI 33.37 kg/m²   The patient is alert, oriented and in no distress.    Vital signs as noted above.  Exogenous obesity (BMI 33)    Head and neck revealed no carotid bruits or jugular venous distension.  No thyromegaly or lymphadenopathy is present.    Lungs clear.  No wheezing.  Breath sounds are normal bilaterally.    Heart normal first and second heart sounds.  No murmur..  No pericardial rub is present.  No gallop is present.    Abdomen soft and nontender.  No organomegaly is present.    Extremities revealed good peripheral pulses without any pedal edema.    Skin warm and dry.    Musculoskeletal system is grossly normal.    CNS grossly normal.    Reviewed and updated.          "

## 2023-09-28 NOTE — PROGRESS NOTES
Encounter Date:09/28/2023      Patient ID: Nav Choudhary is a 58 y.o. male.    Chief Complaint:      History of Present Illness      Assessment and Plan               No diagnosis found.LAB RESULTS (LAST 7 DAYS)    CBC        BMP        CMP         BNP        TROPONIN        CoAg        Creatinine Clearance  Estimated Creatinine Clearance: 123 mL/min (by C-G formula based on SCr of 0.94 mg/dL).    ABG        Radiology  No radiology results for the last day                The following portions of the patient's history were reviewed and updated as appropriate: allergies, current medications, past family history, past medical history, past social history, past surgical history, and problem list.    Review of Systems   Constitutional: Negative for malaise/fatigue.   Cardiovascular:  Negative for chest pain, leg swelling, palpitations and syncope.   Respiratory:  Negative for shortness of breath.    Skin:  Negative for rash.   Gastrointestinal:  Negative for nausea and vomiting.   Neurological:  Negative for dizziness, light-headedness and numbness.   All other systems reviewed and are negative.      Current Outpatient Medications:     apixaban (ELIQUIS) 5 MG tablet tablet, Take 1 tablet by mouth 2 (Two) Times a Day., Disp: 180 tablet, Rfl: 4    atorvastatin (LIPITOR) 40 MG tablet, Take 1 tablet by mouth Daily., Disp: , Rfl:     dilTIAZem CD (CARDIZEM CD) 240 MG 24 hr capsule, Take 1 capsule by mouth Daily., Disp: 90 capsule, Rfl: 3    flecainide (TAMBOCOR) 100 MG tablet, Take 1 tablet by mouth Every 12 (Twelve) Hours., Disp: 30 tablet, Rfl: 0    metoprolol succinate XL (TOPROL-XL) 25 MG 24 hr tablet, Take 2 tablets by mouth Every 12 (Twelve) Hours for 90 days., Disp: 180 tablet, Rfl: 3    nitroglycerin (NITROSTAT) 0.4 MG SL tablet, Place 1 tablet under the tongue Every 5 (Five) Minutes As Needed for Chest Pain (Only if SBP Greater Than 100) for up to 90 days. Take no more than 3 doses in 15 minutes., Disp: 30 tablet,  "Rfl: 12    Allergies   Allergen Reactions    Iodine Irritability     Topical irritation       Family History   Problem Relation Age of Onset    Heart disease Mother     Heart disease Father     Heart attack Father        Past Surgical History:   Procedure Laterality Date    CARDIAC CATHETERIZATION N/A 6/28/2023    Procedure: Left Heart Cath and coronary angiogram;  Surgeon: Greg Jade MD;  Location: [facility] CATH INVASIVE LOCATION;  Service: Cardiovascular;  Laterality: N/A;    CARDIAC ELECTROPHYSIOLOGY PROCEDURE N/A 9/14/2023    Procedure: AFib/Flutter Ablation;  Surgeon: Andrade Mcdowell MD;  Location: [facility] CATH INVASIVE LOCATION;  Service: Cardiovascular;  Laterality: N/A;    KNEE ARTHROPLASTY         Past Medical History:   Diagnosis Date    Abnormal ECG 6/27/23    Arrhythmia 3/13/2021    Atrial fibrillation     HLD (hyperlipidemia)     Hypertension        Family History   Problem Relation Age of Onset    Heart disease Mother     Heart disease Father     Heart attack Father        Social History     Socioeconomic History    Marital status:    Tobacco Use    Smoking status: Never     Passive exposure: Past (AS A CHILD)    Smokeless tobacco: Never   Vaping Use    Vaping Use: Never used   Substance and Sexual Activity    Alcohol use: Yes     Alcohol/week: 12.0 standard drinks     Types: 12 Cans of beer per week     Comment: Beer on occasion    Drug use: Never    Sexual activity: Yes     Partners: Female     Birth control/protection: None         Procedures      Objective:       Physical Exam    /98 (BP Location: Left arm, Patient Position: Sitting, Cuff Size: Large Adult)   Pulse (!) 48 Comment: EKG READING  Ht 190.5 cm (75\")   Wt 121 kg (267 lb)   SpO2 99% Comment: RA  BMI 33.37 kg/m²   The patient is alert, oriented and in no distress.    Vital signs as noted above.    Head and neck revealed no carotid bruits or jugular venous distension.  No thyromegaly or lymphadenopathy is " present.    Lungs clear.  No wheezing.  Breath sounds are normal bilaterally.    Heart normal first and second heart sounds.  No murmur..  No pericardial rub is present.  No gallop is present.    Abdomen soft and nontender.  No organomegaly is present.    Extremities revealed good peripheral pulses without any pedal edema.    Skin warm and dry.    Musculoskeletal system is grossly normal.    CNS grossly normal.

## 2023-10-03 ENCOUNTER — TELEPHONE (OUTPATIENT)
Dept: CARDIOLOGY | Facility: CLINIC | Age: 58
End: 2023-10-03
Payer: COMMERCIAL

## 2023-10-03 NOTE — TELEPHONE ENCOUNTER
Please advise if the new medication is causing rash or could cause a rash. Norvasc 5 mg.    Thank you   ----- Message from Nav Choudhary sent at 10/2/2023  6:18 PM EDT -----  Regarding: Rash  Contact: [phone]  I have gotten a bad red itchy rash all over can this be from the new meds?

## 2023-10-22 NOTE — PROGRESS NOTES
Encounter Date:11/09/2023    Reviewed and updated-9/8/2023      Patient ID: Nav Choudhary is a 58 y.o. male.    Chief Complaint:  Atrial flutter  Anticoagulation management  Shortness of breath  Hypertension  Dyslipidemia        History of Present Illness  Since I have last seen, the patient has been without any chest discomfort ,shortness of breath, palpitations, dizziness or syncope.  Denies having any headache ,abdominal pain ,nausea, vomiting , diarrhea constipation, loss of weight or loss of appetite.  Denies having any excessive bruising ,hematuria or blood in the stool.    Review of all systems negative except as indicated.    Reviewed ROS.        Assessment and Plan         ]]]]]]]]]]]]]]]]]]]]  History  =============  - Patient has atrial flutter with RVR.  Status post ablation for atrial flutter 9/14/2023-Dr. Jeremias SILVA-fib ablation was not performed due to lack of lab time.  EKG 9/28/2023-sinus bradycardia  EKG 11/9/2023-sinus rhythm     - History of atrial fibrillation with RVR.  Patient has increasing frequency of palpitations.  Patient had work-up in [place] approximately 2 years ago.  Patient was maintaining sinus rhythm.  Patient is now having paroxysmal atrial fibrillation.  Patient did not have ablation in the past.     - Shortness of breath-improved     - Mild elevation of troponin at 180-flat (high-sensitivity troponin)  EKG showed no acute changes     Echocardiogram-6/27/2023 showed proximal posterior and inferior hypokinesis.  Lexiscan Cardiolite test is abnormal with proximal posterior wall ischemia.  6/27/2023     Cardiac cath 6/28/2023 revealed normal left ventricle function and normal coronary arteries.     - Hypertension dyslipidemia     - Exogenous obesity     Status post bilateral knee replacements     - Non-smoker     - Occasional beer drinking     - Allergic to iodine  =============  Plan  ==============  Atrial flutter with RVR.  Patient was not responding to medications as  outpatient.  Patient was on metoprolol and p.o. Cardizem.  Patient did not respond to intravenous Cardizem.  Patient was started on intravenous amiodarone.  Patient did not convert to sinus rhythm.  Patient had ablation for atrial flutter 9/14/2023.  Patient is maintaining sinus rhythm.  EKG-sinus rhythm-9/28/2023  EKG normal sinus rhythm-11/9/2023     Decrease Tambocor to 50 mg twice daily.  Continue metoprolol XL 50 mg twice daily     Hypertension-149/83  Maintain blood pressure log.  Patient is on amlodipine 5 mg p.o. every morning.  Patient is on metoprolol XL (25 mg tablet)-50 mg twice daily.    Dyslipidemia-on atorvastatin      TSH is normal at 2.3  Magnesium level 2.1.     Anticoagulation-patient is on Eliquis.  Observe for toxic effects.     Medications were reviewed and updated.  Current medications include Eliquis atorvastatin flecainide (Tambocor) 50 mg every 12 hours metoprolol XL 50 mg twice daily.  Continue Norvasc 5 mg a day.     Follow-up in the office in 3 months with EKG.  Consideration for discontinuation of flecainide (Tambocor) at next visit.    Further plan will depend on patient's progress.     Reviewed and updated-11/9/2023.  ]]]]]]]]]]]]]]]]]]]]]]            Diagnosis Plan   1. Essential hypertension        2. Atrial flutter with rapid ventricular response        3. Chronic anticoagulation        4. Atrial fibrillation with RVR        5. Obesity (BMI 30-39.9)        LAB RESULTS (LAST 7 DAYS)    CBC        BMP        CMP         BNP        TROPONIN        CoAg        Creatinine Clearance  CrCl cannot be calculated (Patient's most recent lab result is older than the maximum 30 days allowed.).    ABG        Radiology  No radiology results for the last day                The following portions of the patient's history were reviewed and updated as appropriate: allergies, current medications, past family history, past medical history, past social history, past surgical history, and problem  list.    Review of Systems   Constitutional: Negative for malaise/fatigue.   Cardiovascular:  Negative for chest pain, leg swelling, palpitations and syncope.   Respiratory:  Negative for shortness of breath.    Skin:  Negative for rash.   Gastrointestinal:  Negative for nausea and vomiting.   Neurological:  Negative for dizziness, light-headedness and numbness.   All other systems reviewed and are negative.        Current Outpatient Medications:     amLODIPine (NORVASC) 5 MG tablet, Take 1 tablet by mouth Daily., Disp: 90 tablet, Rfl: 3    apixaban (ELIQUIS) 5 MG tablet tablet, Take 1 tablet by mouth 2 (Two) Times a Day., Disp: 180 tablet, Rfl: 4    atorvastatin (LIPITOR) 40 MG tablet, Take 1 tablet by mouth Daily., Disp: , Rfl:     flecainide (TAMBOCOR) 100 MG tablet, Take 1 tablet by mouth Every 12 (Twelve) Hours., Disp: 180 tablet, Rfl: 3    metoprolol succinate XL (TOPROL-XL) 25 MG 24 hr tablet, Take 2 tablets by mouth Every 12 (Twelve) Hours for 90 days., Disp: 180 tablet, Rfl: 3    nitroglycerin (NITROSTAT) 0.4 MG SL tablet, Place 1 tablet under the tongue Every 5 (Five) Minutes As Needed for Chest Pain (Only if SBP Greater Than 100) for up to 90 days. Take no more than 3 doses in 15 minutes., Disp: 30 tablet, Rfl: 12    Allergies   Allergen Reactions    Iodine Irritability     Topical irritation       Family History   Problem Relation Age of Onset    Heart disease Mother     Heart disease Father     Heart attack Father        Past Surgical History:   Procedure Laterality Date    CARDIAC CATHETERIZATION N/A 6/28/2023    Procedure: Left Heart Cath and coronary angiogram;  Surgeon: Greg Jade MD;  Location: [facility] CATH INVASIVE LOCATION;  Service: Cardiovascular;  Laterality: N/A;    CARDIAC ELECTROPHYSIOLOGY PROCEDURE N/A 9/14/2023    Procedure: AFib/Flutter Ablation;  Surgeon: Andrade Mcdowell MD;  Location: [facility] CATH INVASIVE LOCATION;  Service: Cardiovascular;  Laterality: N/A;    KNEE  "ARTHROPLASTY         Past Medical History:   Diagnosis Date    Abnormal ECG 6/27/23    Arrhythmia 3/13/2021    Atrial fibrillation     HLD (hyperlipidemia)     Hypertension        Family History   Problem Relation Age of Onset    Heart disease Mother     Heart disease Father     Heart attack Father        Social History     Socioeconomic History    Marital status:    Tobacco Use    Smoking status: Never     Passive exposure: Past (AS A CHILD)    Smokeless tobacco: Never   Vaping Use    Vaping Use: Never used   Substance and Sexual Activity    Alcohol use: Yes     Alcohol/week: 12.0 standard drinks of alcohol     Types: 12 Cans of beer per week     Comment: Beer on occasion    Drug use: Never    Sexual activity: Yes     Partners: Female     Birth control/protection: None           ECG 12 Lead    Date/Time: 11/9/2023 2:40 PM  Performed by: Greg Jade MD    Authorized by: Greg Jade MD  Comparison: compared with previous ECG   Similar to previous ECG  Comparison to previous ECG: Sinus bradycardia 55/min nonspecific ST-T wave changes normal axis normal intervals no ectopy no significant change from previous EKG.        Objective:       Physical Exam    /83 (BP Location: Right arm, Patient Position: Sitting, Cuff Size: Large Adult)   Pulse 55   Ht 190.5 cm (75\")   Wt 125 kg (275 lb)   SpO2 97% Comment: RA  BMI 34.37 kg/m²   The patient is alert, oriented and in no distress.    Vital signs as noted above.  Exogenous obesity (BMI 35)    Head and neck revealed no carotid bruits or jugular venous distension.  No thyromegaly or lymphadenopathy is present.    Lungs clear.  No wheezing.  Breath sounds are normal bilaterally.    Heart normal first and second heart sounds.  No murmur..  No pericardial rub is present.  No gallop is present.    Abdomen soft and nontender.  No organomegaly is present.    Extremities revealed good peripheral pulses without any pedal edema.    Skin warm and " dry.    Musculoskeletal system is grossly normal.    CNS grossly normal.    Reviewed and updated.

## 2023-11-09 ENCOUNTER — OFFICE VISIT (OUTPATIENT)
Dept: CARDIOLOGY | Facility: CLINIC | Age: 58
End: 2023-11-09
Payer: COMMERCIAL

## 2023-11-09 VITALS
WEIGHT: 275 LBS | HEART RATE: 55 BPM | HEIGHT: 75 IN | DIASTOLIC BLOOD PRESSURE: 83 MMHG | SYSTOLIC BLOOD PRESSURE: 149 MMHG | BODY MASS INDEX: 34.19 KG/M2 | OXYGEN SATURATION: 97 %

## 2023-11-09 DIAGNOSIS — E66.9 OBESITY (BMI 30-39.9): ICD-10-CM

## 2023-11-09 DIAGNOSIS — I10 ESSENTIAL HYPERTENSION: Primary | ICD-10-CM

## 2023-11-09 DIAGNOSIS — Z79.01 CHRONIC ANTICOAGULATION: ICD-10-CM

## 2023-11-09 DIAGNOSIS — I48.91 ATRIAL FIBRILLATION WITH RVR: ICD-10-CM

## 2023-11-09 DIAGNOSIS — I48.92 ATRIAL FLUTTER WITH RAPID VENTRICULAR RESPONSE: ICD-10-CM

## 2023-12-05 ENCOUNTER — TELEPHONE (OUTPATIENT)
Dept: CARDIOLOGY | Facility: CLINIC | Age: 58
End: 2023-12-05
Payer: COMMERCIAL

## 2023-12-05 NOTE — TELEPHONE ENCOUNTER
Spoke with patient - advised Dr. Kalie melton with change and he is glad he is feeling better.   Patient understood

## 2023-12-05 NOTE — TELEPHONE ENCOUNTER
"Patient left  stating he had made a medication change on his own and wanted to let you know and make sure it was ok.   Called and spoke with patient - states his tambocor was decreased to a half a pill twice a day and about a week after this he \"could feel my hear trying to go back into afib\" he changed it back to a full pill twice a day and the problem is gone.   Advised I would let you know and call back.   Understood   "

## 2023-12-22 RX ORDER — METOPROLOL SUCCINATE 25 MG/1
50 TABLET, EXTENDED RELEASE ORAL EVERY 12 HOURS SCHEDULED
Qty: 180 TABLET | Refills: 1 | Status: SHIPPED | OUTPATIENT
Start: 2023-12-22 | End: 2024-03-21

## 2023-12-22 NOTE — TELEPHONE ENCOUNTER
Rx Refill Note  Requested Prescriptions     Pending Prescriptions Disp Refills    metoprolol succinate XL (TOPROL-XL) 25 MG 24 hr tablet [Pharmacy Med Name: METOPROLOL SUCC ER 25 MG TAB] 360 tablet 1     Sig: TAKE 2 TABLETS BY MOUTH EVERY 12 (TWELVE) HOURS FOR 90 DAYS.      Last office visit with prescribing clinician: 11/9/2023   Last telemedicine visit with prescribing clinician: Visit date not found   Next office visit with prescribing clinician: 2/13/2024                         Would you like a call back once the refill request has been completed: [] Yes [] No    If the office needs to give you a call back, can they leave a voicemail: [] Yes [] No    Farida Pappas MA  12/22/23, 07:52 EST

## 2024-02-13 ENCOUNTER — OFFICE VISIT (OUTPATIENT)
Dept: CARDIOLOGY | Facility: CLINIC | Age: 59
End: 2024-02-13
Payer: COMMERCIAL

## 2024-02-13 VITALS
HEIGHT: 75 IN | SYSTOLIC BLOOD PRESSURE: 141 MMHG | HEART RATE: 64 BPM | OXYGEN SATURATION: 96 % | DIASTOLIC BLOOD PRESSURE: 88 MMHG | WEIGHT: 274 LBS | BODY MASS INDEX: 34.07 KG/M2

## 2024-02-13 DIAGNOSIS — E66.9 OBESITY (BMI 30-39.9): ICD-10-CM

## 2024-02-13 DIAGNOSIS — I48.92 ATRIAL FLUTTER WITH RAPID VENTRICULAR RESPONSE: ICD-10-CM

## 2024-02-13 DIAGNOSIS — Z79.01 CHRONIC ANTICOAGULATION: ICD-10-CM

## 2024-02-13 DIAGNOSIS — I10 ESSENTIAL HYPERTENSION: Primary | ICD-10-CM

## 2024-02-13 DIAGNOSIS — R79.89 ELEVATED TROPONIN: ICD-10-CM

## 2024-02-13 DIAGNOSIS — I48.91 ATRIAL FIBRILLATION WITH RVR: ICD-10-CM

## 2024-02-13 PROCEDURE — 93000 ELECTROCARDIOGRAM COMPLETE: CPT | Performed by: INTERNAL MEDICINE

## 2024-02-13 PROCEDURE — 99214 OFFICE O/P EST MOD 30 MIN: CPT | Performed by: INTERNAL MEDICINE

## 2024-03-14 RX ORDER — METOPROLOL SUCCINATE 25 MG/1
50 TABLET, EXTENDED RELEASE ORAL EVERY 12 HOURS SCHEDULED
Qty: 360 TABLET | Refills: 3 | Status: SHIPPED | OUTPATIENT
Start: 2024-03-14

## 2024-03-14 NOTE — TELEPHONE ENCOUNTER
Rx Refill Note  Requested Prescriptions     Signed Prescriptions Disp Refills    metoprolol succinate XL (TOPROL-XL) 25 MG 24 hr tablet 360 tablet 3     Sig: Take 2 tablets by mouth Every 12 (Twelve) Hours.     Authorizing Provider: KENIA BRUCE     Ordering User: KRISTIE MORENO      Last office visit with prescribing clinician: 2/13/2024   Last telemedicine visit with prescribing clinician: Visit date not found   Next office visit with prescribing clinician: 8/22/2024                         Would you like a call back once the refill request has been completed: [] Yes [] No    If the office needs to give you a call back, can they leave a voicemail: [] Yes [] No    Kristie Moreno MA  03/14/24, 13:34 EDT

## 2024-10-07 RX ORDER — FLECAINIDE ACETATE 100 MG/1
100 TABLET ORAL EVERY 12 HOURS
Qty: 180 TABLET | Refills: 1 | Status: SHIPPED | OUTPATIENT
Start: 2024-10-07

## 2024-10-07 NOTE — TELEPHONE ENCOUNTER
Rx Refill Note  Requested Prescriptions     Signed Prescriptions Disp Refills    flecainide (TAMBOCOR) 100 MG tablet 180 tablet 1     Sig: TAKE 1 TABLET BY MOUTH EVERY 12 HOURS     Authorizing Provider: KENIA BRUCE     Ordering User: KRISTIE MORENO      Last office visit with prescribing clinician: 2/13/2024   Last telemedicine visit with prescribing clinician: Visit date not found   Next office visit with prescribing clinician: Visit date not found                         Would you like a call back once the refill request has been completed: [] Yes [] No    If the office needs to give you a call back, can they leave a voicemail: [] Yes [] No    Kristie Moreno MA  10/07/24, 07:54 EDT

## (undated) DEVICE — Device: Brand: CARTO 3

## (undated) DEVICE — PK TRY HEART CATH 50

## (undated) DEVICE — PROVE COVER: Brand: UNBRANDED

## (undated) DEVICE — Device: Brand: SOUNDSTAR

## (undated) DEVICE — Device: Brand: THERMOCOOL SMARTTOUCH SF

## (undated) DEVICE — PINNACLE INTRODUCER SHEATH: Brand: PINNACLE

## (undated) DEVICE — Device: Brand: REFERENCE PATCH CARTO 3

## (undated) DEVICE — INTRO STEER AGILIS NXT MED/CURL 8.5F

## (undated) DEVICE — Device: Brand: WEBSTER CS

## (undated) DEVICE — INTERFACE CABLE: Brand: CARTO 3 SYSTEM ECO INTERFACE CABLE

## (undated) DEVICE — Device: Brand: SMARTABLATE

## (undated) DEVICE — ELECTRD DEFIB M/FUNC PROPADZ RADIOL 2PK

## (undated) DEVICE — PAD E/S GRND SGL/FOIL 9FT/CORD DISP

## (undated) DEVICE — OCTA,PERSEID,2-2-2-2-2,D-CURVE: Brand: OCTARAY MAPPING CATHETER